# Patient Record
Sex: MALE | Employment: FULL TIME
[De-identification: names, ages, dates, MRNs, and addresses within clinical notes are randomized per-mention and may not be internally consistent; named-entity substitution may affect disease eponyms.]

---

## 2021-01-01 ENCOUNTER — EXTERNAL RECORD (OUTPATIENT)
Dept: HEALTH INFORMATION MANAGEMENT | Facility: OTHER | Age: 51
End: 2021-01-01

## 2021-06-11 ENCOUNTER — HOSPITAL ENCOUNTER (EMERGENCY)
Age: 51
Discharge: LEFT BEFORE DISCHARGE | End: 2021-06-11

## 2021-06-11 VITALS
RESPIRATION RATE: 16 BRPM | HEART RATE: 102 BPM | TEMPERATURE: 97.9 F | SYSTOLIC BLOOD PRESSURE: 118 MMHG | DIASTOLIC BLOOD PRESSURE: 85 MMHG | OXYGEN SATURATION: 95 %

## 2021-06-11 ASSESSMENT — PAIN SCALES - GENERAL: PAINLEVEL_OUTOF10: 5

## 2021-06-24 ENCOUNTER — TELEPHONE (OUTPATIENT)
Dept: SCHEDULING | Age: 51
End: 2021-06-24

## 2021-06-30 ENCOUNTER — OFFICE VISIT (OUTPATIENT)
Dept: INTERNAL MEDICINE | Age: 51
End: 2021-06-30

## 2021-06-30 VITALS
SYSTOLIC BLOOD PRESSURE: 107 MMHG | WEIGHT: 199.08 LBS | BODY MASS INDEX: 29.49 KG/M2 | HEIGHT: 69 IN | DIASTOLIC BLOOD PRESSURE: 75 MMHG | HEART RATE: 76 BPM | OXYGEN SATURATION: 97 % | TEMPERATURE: 97 F

## 2021-06-30 DIAGNOSIS — R13.10 DYSPHAGIA, UNSPECIFIED TYPE: Primary | ICD-10-CM

## 2021-06-30 DIAGNOSIS — Z12.11 ENCOUNTER FOR SCREENING COLONOSCOPY: ICD-10-CM

## 2021-06-30 DIAGNOSIS — I10 ESSENTIAL HYPERTENSION, BENIGN: ICD-10-CM

## 2021-06-30 DIAGNOSIS — Z21 HIV POSITIVE (CMD): ICD-10-CM

## 2021-06-30 PROBLEM — R20.2 PARESTHESIA OF LEFT UPPER AND LOWER EXTREMITY: Status: ACTIVE | Noted: 2020-10-19

## 2021-06-30 PROBLEM — Z00.00 PREVENTATIVE HEALTH CARE: Status: ACTIVE | Noted: 2020-10-19

## 2021-06-30 PROCEDURE — 3074F SYST BP LT 130 MM HG: CPT | Performed by: INTERNAL MEDICINE

## 2021-06-30 PROCEDURE — 3078F DIAST BP <80 MM HG: CPT | Performed by: INTERNAL MEDICINE

## 2021-06-30 PROCEDURE — 99204 OFFICE O/P NEW MOD 45 MIN: CPT | Performed by: INTERNAL MEDICINE

## 2021-06-30 RX ORDER — ALPRAZOLAM 0.5 MG/1
TABLET ORAL
COMMUNITY
Start: 2015-06-01

## 2021-06-30 RX ORDER — LANOLIN ALCOHOL/MO/W.PET/CERES
1000 CREAM (GRAM) TOPICAL DAILY
COMMUNITY

## 2021-06-30 RX ORDER — LISINOPRIL 5 MG/1
5 TABLET ORAL
COMMUNITY
End: 2021-08-14 | Stop reason: SDUPTHER

## 2021-06-30 RX ORDER — BICTEGRAVIR SODIUM, EMTRICITABINE, AND TENOFOVIR ALAFENAMIDE FUMARATE 50; 200; 25 MG/1; MG/1; MG/1
TABLET ORAL
COMMUNITY
Start: 2021-05-20

## 2021-06-30 RX ORDER — OMEPRAZOLE 20 MG/1
20 CAPSULE, DELAYED RELEASE ORAL DAILY
COMMUNITY

## 2021-06-30 RX ORDER — EMTRICITABINE AND TENOFOVIR ALAFENAMIDE 200; 25 MG/1; MG/1
TABLET ORAL
COMMUNITY
Start: 2017-12-08

## 2021-06-30 ASSESSMENT — PATIENT HEALTH QUESTIONNAIRE - PHQ9
1. LITTLE INTEREST OR PLEASURE IN DOING THINGS: NOT AT ALL
CLINICAL INTERPRETATION OF PHQ9 SCORE: NO FURTHER SCREENING NEEDED
2. FEELING DOWN, DEPRESSED OR HOPELESS: NOT AT ALL
SUM OF ALL RESPONSES TO PHQ9 QUESTIONS 1 AND 2: 0
SUM OF ALL RESPONSES TO PHQ9 QUESTIONS 1 AND 2: 0
CLINICAL INTERPRETATION OF PHQ2 SCORE: NO FURTHER SCREENING NEEDED

## 2021-06-30 ASSESSMENT — PAIN SCALES - GENERAL: PAINLEVEL: 0

## 2021-07-16 ENCOUNTER — TELEPHONE (OUTPATIENT)
Dept: SCHEDULING | Age: 51
End: 2021-07-16

## 2021-08-16 RX ORDER — LISINOPRIL 5 MG/1
5 TABLET ORAL DAILY
Qty: 90 TABLET | Refills: 2 | Status: SHIPPED | OUTPATIENT
Start: 2021-08-16 | End: 2021-10-14 | Stop reason: SDUPTHER

## 2021-09-10 ENCOUNTER — CLINICAL ABSTRACT (OUTPATIENT)
Dept: HEALTH INFORMATION MANAGEMENT | Age: 51
End: 2021-09-10

## 2021-10-06 ENCOUNTER — TELEPHONE (OUTPATIENT)
Dept: SCHEDULING | Age: 51
End: 2021-10-06

## 2021-10-06 DIAGNOSIS — R07.9 CHEST PAIN, UNSPECIFIED TYPE: Primary | ICD-10-CM

## 2021-10-14 RX ORDER — LISINOPRIL 5 MG/1
5 TABLET ORAL DAILY
Qty: 90 TABLET | Refills: 2 | Status: SHIPPED | OUTPATIENT
Start: 2021-10-14

## 2022-10-28 ENCOUNTER — LAB REQUISITION (OUTPATIENT)
Dept: LAB | Facility: CLINIC | Age: 52
End: 2022-10-28

## 2022-10-28 PROCEDURE — 86765 RUBEOLA ANTIBODY: CPT | Performed by: INTERNAL MEDICINE

## 2022-10-28 PROCEDURE — 86762 RUBELLA ANTIBODY: CPT | Performed by: INTERNAL MEDICINE

## 2022-10-28 PROCEDURE — 86481 TB AG RESPONSE T-CELL SUSP: CPT | Performed by: INTERNAL MEDICINE

## 2022-10-28 PROCEDURE — 86787 VARICELLA-ZOSTER ANTIBODY: CPT | Performed by: INTERNAL MEDICINE

## 2022-10-28 PROCEDURE — 86735 MUMPS ANTIBODY: CPT | Performed by: INTERNAL MEDICINE

## 2022-10-28 PROCEDURE — 86706 HEP B SURFACE ANTIBODY: CPT | Performed by: INTERNAL MEDICINE

## 2022-10-30 LAB
GAMMA INTERFERON BACKGROUND BLD IA-ACNC: 0.08 IU/ML
M TB IFN-G BLD-IMP: NEGATIVE
M TB IFN-G CD4+ BCKGRND COR BLD-ACNC: 9.92 IU/ML
MITOGEN IGNF BCKGRD COR BLD-ACNC: -0.01 IU/ML
MITOGEN IGNF BCKGRD COR BLD-ACNC: 0 IU/ML
QUANTIFERON MITOGEN: 10 IU/ML
QUANTIFERON NIL TUBE: 0.08 IU/ML
QUANTIFERON TB1 TUBE: 0.08 IU/ML
QUANTIFERON TB2 TUBE: 0.07

## 2022-10-31 LAB
HBV SURFACE AB SERPL IA-ACNC: 42.04 M[IU]/ML
HBV SURFACE AB SERPL IA-ACNC: REACTIVE M[IU]/ML
MEV IGG SER IA-ACNC: 18.5 AU/ML
MEV IGG SER IA-ACNC: POSITIVE
MUMPS ANTIBODY IGG INSTRUMENT VALUE: 236 AU/ML
MUV IGG SER QL IA: POSITIVE
RUBV IGG SERPL QL IA: 9.4 INDEX
RUBV IGG SERPL QL IA: POSITIVE
VZV IGG SER QL IA: >4000 INDEX
VZV IGG SER QL IA: POSITIVE

## 2022-11-28 ENCOUNTER — TRANSCRIBE ORDERS (OUTPATIENT)
Dept: OTHER | Age: 52
End: 2022-11-28

## 2022-11-28 DIAGNOSIS — G43.709 CHRONIC MIGRAINE WITHOUT AURA WITHOUT STATUS MIGRAINOSUS, NOT INTRACTABLE: Primary | ICD-10-CM

## 2022-12-19 NOTE — TELEPHONE ENCOUNTER
RECORDS RECEIVED FROM: CE   DATE RECEIVED: 1.26.23   NOTES (Gather within 2 years) STATUS DETAILS   OFFICE NOTE from other specialist CE 7.14.22  Jaime Gracia ID    5.16.22  Micky Gracia IM    2.10.22  Kath Gracia ID    6.30.21  Mini  [facility] IM    1.14.21, 5.7.20  Ryan  [facility] ID   LABS (any labs) Internal/CE    MEDICATION LIST Internal/CE

## 2022-12-29 ENCOUNTER — OFFICE VISIT (OUTPATIENT)
Dept: URGENT CARE | Facility: URGENT CARE | Age: 52
End: 2022-12-29
Payer: COMMERCIAL

## 2022-12-29 ENCOUNTER — ANCILLARY PROCEDURE (OUTPATIENT)
Dept: GENERAL RADIOLOGY | Facility: CLINIC | Age: 52
End: 2022-12-29
Attending: PHYSICIAN ASSISTANT
Payer: COMMERCIAL

## 2022-12-29 VITALS
TEMPERATURE: 97.4 F | SYSTOLIC BLOOD PRESSURE: 109 MMHG | DIASTOLIC BLOOD PRESSURE: 77 MMHG | OXYGEN SATURATION: 96 % | BODY MASS INDEX: 29.62 KG/M2 | HEIGHT: 69 IN | WEIGHT: 200 LBS | HEART RATE: 77 BPM

## 2022-12-29 DIAGNOSIS — S99.911A INJURY OF RIGHT ANKLE, INITIAL ENCOUNTER: Primary | ICD-10-CM

## 2022-12-29 LAB
CRP SERPL-MCNC: 5.9 MG/L (ref 0–8)
ERYTHROCYTE [SEDIMENTATION RATE] IN BLOOD BY WESTERGREN METHOD: 6 MM/HR (ref 0–20)
URATE SERPL-MCNC: 7.1 MG/DL (ref 3.5–7.2)

## 2022-12-29 PROCEDURE — 36415 COLL VENOUS BLD VENIPUNCTURE: CPT | Performed by: PHYSICIAN ASSISTANT

## 2022-12-29 PROCEDURE — 84550 ASSAY OF BLOOD/URIC ACID: CPT | Performed by: PHYSICIAN ASSISTANT

## 2022-12-29 PROCEDURE — 85652 RBC SED RATE AUTOMATED: CPT | Performed by: PHYSICIAN ASSISTANT

## 2022-12-29 PROCEDURE — 86140 C-REACTIVE PROTEIN: CPT | Performed by: PHYSICIAN ASSISTANT

## 2022-12-29 PROCEDURE — 99204 OFFICE O/P NEW MOD 45 MIN: CPT | Performed by: PHYSICIAN ASSISTANT

## 2022-12-29 PROCEDURE — 73610 X-RAY EXAM OF ANKLE: CPT | Mod: TC | Performed by: RADIOLOGY

## 2022-12-29 RX ORDER — CLONAZEPAM 0.5 MG/1
0.5 TABLET ORAL
COMMUNITY
Start: 2022-12-16 | End: 2023-02-02

## 2022-12-29 RX ORDER — PREDNISONE 20 MG/1
20 TABLET ORAL 2 TIMES DAILY
Qty: 10 TABLET | Refills: 0 | Status: SHIPPED | OUTPATIENT
Start: 2022-12-29 | End: 2023-01-03

## 2022-12-29 NOTE — PROGRESS NOTES
Injury of right ankle, initial encounter  - XR Ankle Right G/E 3 Views  - Uric acid; Future  - ESR: Erythrocyte sedimentation rate; Future  - CRP, inflammation; Future  - predniSONE (DELTASONE) 20 MG tablet; Take 1 tablet (20 mg) by mouth 2 times daily for 5 days  - Ankle/Foot Bracing Supplies Order for DME - ONLY FOR DME    Possible gout flareup although the patient does not match presentation perfectly. I would like him to start a 5 days course of prednisone and watch for improvement. Awaiting lab results.     Rest the affected area as much as possible.  Apply ice for 15-20 minutes intermittently as needed and especially after any offending activity. Hot packs are better for muscle spasms and cramping. Daily stretching as tolerated.  As pain recedes, begin normal activities slowly as tolerated.  Consider Physical Therapy after 6 weeks if symptoms not better with conservative care.      Okay to take acetaminophen 500 mg- 2 tabs (Total of 1000 mg) every 8 hrs   Okay to take ibuprofen 200 mg- 3 tabs (Total of 600 mg) every 6 hours      Jewel Cristina PA-C  [facility]    Subjective   52 year old who presents to clinic today for the following health issues:    Ankle Problem       HPI     Pain History:  When did you first notice your pain? - Acute Pain   Where in your body do you have pain? Musculoskeletal problem/pain  Onset/Duration: Yesterday pt might have twisted rt ankle, sore and hard to walk on.  Description  Location: Right ankle   Joint Swelling: No  Redness: No  Pain: YES  Warmth: No  Intensity:  moderate  Progression of Symptoms:  same  Accompanying signs and symptoms:   Fevers: No  Numbness/tingling/weakness: No  History  Trauma to the area: YES  Recent illness:  No  Previous similar problem: No  Previous evaluation:  No  Precipitating or alleviating factors:  Aggravating factors include: standing, walking and climbing stairs  Therapies tried and outcome: rest/inactivity, ice and  elevation    Review of Systems   Review of Systems   See HPI    Objective    Temp: 97.4  F (36.3  C) Temp src: Temporal BP: 109/77 Pulse: 77     SpO2: 96 %       Physical Exam   Physical Exam  Constitutional:       General: He is not in acute distress.     Appearance: Normal appearance. He is normal weight. He is not ill-appearing, toxic-appearing or diaphoretic.   HENT:      Head: Normocephalic and atraumatic.   Cardiovascular:      Rate and Rhythm: Normal rate.      Pulses: Normal pulses.   Pulmonary:      Effort: Pulmonary effort is normal. No respiratory distress.   Musculoskeletal:        Feet:    Feet:      Comments: Pain in the areas shown above with bearing weight but no warmth, erythema, or deformities noted.   Neurological:      Mental Status: He is alert.   Psychiatric:         Mood and Affect: Mood normal.         Behavior: Behavior normal.         Thought Content: Thought content normal.         Judgment: Judgment normal.          Results for orders placed or performed in visit on 12/29/22 (from the past 24 hour(s))   XR Ankle Right G/E 3 Views    Narrative    EXAM: ANKLE RIGHT THREE OR MORE VIEWS  DATE/TIME: 12/29/2022 11:23 AM     INDICATION: Right ankle injury and pain.  COMPARISON: None.      Impression    IMPRESSION: Normal joint spacing and alignment.  No fracture.   Achilles calcaneal spur.       JAMES FAITH MD         SYSTEM ID:  [id number]

## 2023-01-26 ENCOUNTER — PRE VISIT (OUTPATIENT)
Dept: INFECTIOUS DISEASES | Facility: CLINIC | Age: 53
End: 2023-01-26
Payer: COMMERCIAL

## 2023-01-26 ASSESSMENT — ENCOUNTER SYMPTOMS
MYALGIAS: 1
DIZZINESS: 0
HEADACHES: 1
SEIZURES: 0
BACK PAIN: 1
PARALYSIS: 0
SPEECH CHANGE: 0
MEMORY LOSS: 0
MUSCLE WEAKNESS: 0
JOINT SWELLING: 0
WEAKNESS: 0
TINGLING: 1
STIFFNESS: 1
LOSS OF CONSCIOUSNESS: 0
TREMORS: 0
NECK PAIN: 1
DISTURBANCES IN COORDINATION: 0
NUMBNESS: 1
ARTHRALGIAS: 1
MUSCLE CRAMPS: 0

## 2023-01-30 NOTE — TELEPHONE ENCOUNTER
3/27/2023-Request for images faxed to [facility] @ 1043am  -Images now in PACS-MR @ 1253pm    FUTURE VISIT INFORMATION      FUTURE VISIT INFORMATION:    Date: 4/20/2023    Time: 830am    Location: [facility]  REFERRAL INFORMATION:    Referring provider:  Dr. Shetty     Referring providers clinic:  [facility]     Reason for visit/diagnosis  Migraines     RECORDS REQUESTED FROM:       Clinic name Comments Records Status Imaging Status   [facility]  ED Visit-11/7/2022    CT Head-11/7/2022    MR Brain-9/26/2021 Care Everywhere Requested

## 2023-02-02 ENCOUNTER — OFFICE VISIT (OUTPATIENT)
Dept: INFECTIOUS DISEASES | Facility: CLINIC | Age: 53
End: 2023-02-02
Attending: INTERNAL MEDICINE
Payer: COMMERCIAL

## 2023-02-02 VITALS
HEIGHT: 69 IN | DIASTOLIC BLOOD PRESSURE: 71 MMHG | WEIGHT: 205.2 LBS | SYSTOLIC BLOOD PRESSURE: 109 MMHG | BODY MASS INDEX: 30.39 KG/M2 | HEART RATE: 78 BPM | TEMPERATURE: 98.2 F

## 2023-02-02 DIAGNOSIS — R21 FACIAL RASH: ICD-10-CM

## 2023-02-02 DIAGNOSIS — B20 HUMAN IMMUNODEFICIENCY VIRUS (HIV) DISEASE (H): Primary | ICD-10-CM

## 2023-02-02 DIAGNOSIS — R51.9 ACHING HEADACHE: ICD-10-CM

## 2023-02-02 PROCEDURE — 99204 OFFICE O/P NEW MOD 45 MIN: CPT | Mod: GC | Performed by: INTERNAL MEDICINE

## 2023-02-02 PROCEDURE — G0463 HOSPITAL OUTPT CLINIC VISIT: HCPCS | Performed by: INTERNAL MEDICINE

## 2023-02-02 RX ORDER — LISINOPRIL 5 MG/1
1 TABLET ORAL DAILY
COMMUNITY
Start: 2021-10-14 | End: 2023-02-02

## 2023-02-02 RX ORDER — LISINOPRIL 5 MG/1
5 TABLET ORAL DAILY
Qty: 90 TABLET | Refills: 1 | Status: SHIPPED | OUTPATIENT
Start: 2023-02-02 | End: 2023-09-21

## 2023-02-02 ASSESSMENT — PAIN SCALES - GENERAL: PAINLEVEL: NO PAIN (0)

## 2023-02-02 NOTE — NURSING NOTE
"Chief Complaint   Patient presents with     Consult     Establish care with B20     /71   Pulse 78   Temp 98.2  F (36.8  C) (Oral)   Ht 1.753 m (5' 9\")   Wt 93.1 kg (205 lb 3.2 oz)   BMI 30.30 kg/m    Marie Avery CMA on 2/2/2023 at 8:11 AM    "

## 2023-02-02 NOTE — PROGRESS NOTES
[facility]    Division of Infectious Diseases and International Medicine    CLINICAL INFECTIOUS DISEASE OUTPATIENT CONSULTATION NOTE     Patient:  Toi Gresham, Date of birth 1970, Medical record number [id number]  Referred by: No ref. provider found   Reason for Visit: Establish care         Assessment and Plan     Human immunodeficiency virus   Excellent adherence, undetectable 9/2022. Previously followed in [place] at [facility] and [facility].   - CBC, CMP  - HIV VL, T cell subset     Facial rash   Ddx includes seborrheic dermatitis vs rosacea. Using OTC hydrocortisone with some minimal effect.   - Dermatology referral    Cervical and lumbar radiculoapthy   Cifuentes  Had previously been seen by Neurology and was felt the ha was related to cervical radiculopathy. He has an appointment scheduled 4/20/23 to follow up on this which he was encouraged to keep.     HTN   Refill Lisinopril 5mg, BP at goal     Microscopic hematuria   Reports this has been present for many years. Previously had it worked up with a renal ultrasound which was unremarkable. sCr generally ~1.2.   - Repeat UA, will monitor for proteinuria     GERD  Reflux esophagitis on EGD 2021, on Omeprazole but would like to discuss potential alternatives at future appointment  - Continue Omeprazole for now     Preventative Medicine  Cardiovascular Stanislaw:                        Lipids: 9/2021 , HDL 57, TG 54, LDL 61                       Blood Pressure: At goal, on 5mg Lisinopril   Cancer Screening:                       Colon: 8/13/2021 negative                        Anal: will address next visit   Immunizations:                       Hepatitis A: not on file, 12/2017 reactive IgG                       Hepatitis B:  not on file, 10/28/22 immune by serology                        Influenza: Recommended Seasonal Vaccine                       Pneumovax/Prevnar PPSV23 4/24/2018                       Tdap: 4/28/2016                        Meningococcus: 1/26/2016, 4/28/2016  Bone Health: No screening indicated at this time  Renal Health: Cr: 1.22, UA done 1/2021 with small blood, reports h/o unremarkable renal US   Sexually Transmitted Infection Risk and Screening:                       Gonorrhea and Chlamydia: not detected 9/2021                        Syphilis: RPR negative 9/2021               RTC 6 months         HIV History:   Date of Diagnosis: August 1990, started ART 2009  Approximated time of transmission: Negative 2 months prior to diagnosis   CD4 Chencho: 289  Viral Load at Diagnosis: not on file   Opportunistic Infections: no h/o OI  CMV Status: not on file   Toxo Status: not on file   HLA  Status: not on file   Tuberculosis Screening: 10/28/2022   Historical use of ARVs: Atripla, Raltegravir + Truvada, Dolutegravir + Descovy   Historical Resistance Mutations: not on file        History of Present Illness:     Toi Gresham is a 52 year old y.o with a PMH of HTN, HIV who presents to [facility] care.     Toi moved to the [facility] about 1 year ago. Briefly was seen at [facility] but needed to transfer care to  for insurance purposes. He was diagnosed with HIV in 1990 and did not start ART until about 2009 when Atripla came out. Has been on various ART regimens including Atripla, Raltegravir + Truvvada, Doluitegravir + Descovy with good control. Was switched to Biktarvy about 6 months ago and has been tolerating it well. Reports no missed doses. Sexually active, monogamous with partner, no acute concerns for STI.     Has a rash on his face which has been present for many years. Located mostly around his eyebrows and nasolabial folds. Slightly erythematous and flaky, occasionally itchy. Has been using some OTC hydrocortisone cream with helps minimally. Does have ha which has previously been worked up and been attributed to cervical radiculopathy.        Key Prior Lab/Imaging and other data   10/28/22   Quant gold  negative   VZV IgG reactive   Rubella IgG reactive   Mumps IgG reactive   Rubeola IgG reactive   HBV surface Ab reactive     9/20/22   HIV RNA quant not detected   Cr 1.22        Review of Systems:     The following systems were reviewed with the patient as they pertain to the case and are negative unless noted here or above in the HPI. The patient was  able to participate in the following review of systems    REVIEW OF SYSTEMS:   Constitutional: No fevers, no chills, no sweats  Cardiac: No history of chest pain, No palpitations  Respiratory: No shortness of breath, no wheeze, no cough  Gastro Intestinal: No history of abdominal pain, no vomiting, no diarrhea  Neurological: + headaches, no new or changing weakness, no new or changing numbness  Musculoskeletal: No joint pain or swelling, +back pain HEENT: No congestion No stridor  Allergic: No Hives No Rash  Hematologic: No Easy Bruising, No Nosebleeds,   Genitourinary: No Dysuria, No Frequency  Endocrine: No Polyuria, No Polydipsia  Skin/Integumentary: + Rash, No Ulcer  Opthalmologic: No Diplopia, No Flashes        Other Medical History:     Attempt was made to collect past, family and social history during this encounter,  this information was reviewed with the patient and updated    Allergies Patient has no known allergies.    Past Medical History  No past medical history on file. PastSurgical History   has no past surgical history on file.   Family History  No family history on file. Social History  He reports that he has never smoked. He has never used smokeless tobacco. He reports current alcohol use. He reports that he does not use drugs.   , partner works in [employer] business which is why they relocated to [place]. 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    Notable Exposures Listed below if pertinent    Vaccination History:  Immunization History   Administered Date(s) Administered     COVID-19 Vaccine Bivalent  "Booster 12+ (Pfizer) 09/24/2022             Physical Exam:     VITAL SIGNS:  Blood pressure 109/71, pulse 78, temperature 98.2  F (36.8  C), temperature source Oral, height 1.753 m (5' 9\"), weight 93.1 kg (205 lb 3.2 oz).     GENERAL APPEARANCE: Not in acute distress  PHYSICAL EXAM:   Eyes:     no ptosis, no discharge, no scleral icterus  Mouth, Throat:     mucous membranes moist  Cardiovascular:    Inspection: No Cyanosis, JVD not elevated   Auscultation:  S1, S2 normal, regular rate and rhythm  Respiratory:     Inspection: Not in respiratory distress, Chest expansion symmetrical   Auscultation: 4 point auscultation done clear to auscultation bilaterally, no wheezes, no rales, and no rhonchi  Musculoskeletal:     no elbow wrist knee or ankle deformity, no quadriceps calf or upper arm muscular tenderness noted  Skin:     Dry and intact, there is slight erythematous rash along b/l eyebrows and nasolabial folds   Neurologic:     Higher Mental Function: Conversant, AOx4   Facial asymmetry grossly absent   Patient is ambulatory   Psychiatric:     Appropriate        Signature:     Sunshine Holt MD   Infectious Disease Fellow    Case discussed with the supervising attending ID physician, Dr Jean    This dictation was prepared in part using Dragon recognition software.  As a result errors may occur. When identified these transcription errors have been corrected.  While every attempt is made to correct errors during dictation, errors may still exist     "

## 2023-02-02 NOTE — LETTER
2/2/2023       RE: Toi Gresham  [address] [facility] [address]     Dear Colleague,    Thank you for referring your patient, Toi Gresham, to the [facility] [place] at [facility]. Please see a copy of my visit note below.     [facility]    Division of Infectious Diseases and International Medicine    CLINICAL INFECTIOUS DISEASE OUTPATIENT CONSULTATION NOTE     Patient:  Toi Gresham, Date of birth 1970, Medical record number [id number]  Referred by: No ref. provider found   Reason for Visit: Establish care         Assessment and Plan     Human immunodeficiency virus   Excellent adherence, undetectable 9/2022. Previously followed in [place] at [facility] and [facility].   - CBC, CMP  - HIV VL, T cell subset     Facial rash   Ddx includes seborrheic dermatitis vs rosacea. Using OTC hydrocortisone with some minimal effect.   - Dermatology referral    Cervical and lumbar radiculoapthy   Cifuentes  Had previously been seen by Neurology and was felt the ha was related to cervical radiculopathy. He has an appointment scheduled 4/20/23 to follow up on this which he was encouraged to keep.     HTN   Refill Lisinopril 5mg, BP at goal     Microscopic hematuria   Reports this has been present for many years. Previously had it worked up with a renal ultrasound which was unremarkable. sCr generally ~1.2.   - Repeat UA, will monitor for proteinuria     GERD  Reflux esophagitis on EGD 2021, on Omeprazole but would like to discuss potential alternatives at future appointment  - Continue Omeprazole for now     Preventative Medicine  Cardiovascular Stanislaw:                        Lipids: 9/2021 , HDL 57, TG 54, LDL 61                       Blood Pressure: At goal, on 5mg Lisinopril   Cancer Screening:                       Colon: 8/13/2021 negative                        Anal: will address next visit    Immunizations:                       Hepatitis A: not on file, 12/2017 reactive IgG                       Hepatitis B:  not on file, 10/28/22 immune by serology                        Influenza: Recommended Seasonal Vaccine                       Pneumovax/Prevnar PPSV23 4/24/2018                       Tdap: 4/28/2016                       Meningococcus: 1/26/2016, 4/28/2016  Bone Health: No screening indicated at this time  Renal Health: Cr: 1.22, UA done 1/2021 with small blood, reports h/o unremarkable renal US   Sexually Transmitted Infection Risk and Screening:                       Gonorrhea and Chlamydia: not detected 9/2021                        Syphilis: RPR negative 9/2021               RTC 6 months         HIV History:   Date of Diagnosis: August 1990, started ART 2009  Approximated time of transmission: Negative 2 months prior to diagnosis   CD4 Chencho: 289  Viral Load at Diagnosis: not on file   Opportunistic Infections: no h/o OI  CMV Status: not on file   Toxo Status: not on file   HLA  Status: not on file   Tuberculosis Screening: 10/28/2022   Historical use of ARVs: Atripla, Raltegravir + Truvada, Dolutegravir + Descovy   Historical Resistance Mutations: not on file        History of Present Illness:     Toi Gresham is a 52 year old y.o with a PMH of HTN, HIV who presents to [facility] care.     Toi moved to the [facility] about 1 year ago. Briefly was seen at [facility] but needed to transfer care to  for insurance purposes. He was diagnosed with HIV in 1990 and did not start ART until about 2009 when Atripla came out. Has been on various ART regimens including Atripla, Raltegravir + Truvvada, Doluitegravir + Descovy with good control. Was switched to Biktarvy about 6 months ago and has been tolerating it well. Reports no missed doses. Sexually active, monogamous with partner, no acute concerns for STI.     Has a rash on his face which has been present for many years. Located mostly around  his eyebrows and nasolabial folds. Slightly erythematous and flaky, occasionally itchy. Has been using some OTC hydrocortisone cream with helps minimally. Does have ha which has previously been worked up and been attributed to cervical radiculopathy.        Key Prior Lab/Imaging and other data   10/28/22   Quant gold negative   VZV IgG reactive   Rubella IgG reactive   Mumps IgG reactive   Rubeola IgG reactive   HBV surface Ab reactive     9/20/22   HIV RNA quant not detected   Cr 1.22        Review of Systems:     The following systems were reviewed with the patient as they pertain to the case and are negative unless noted here or above in the HPI. The patient was  able to participate in the following review of systems    REVIEW OF SYSTEMS:   Constitutional: No fevers, no chills, no sweats  Cardiac: No history of chest pain, No palpitations  Respiratory: No shortness of breath, no wheeze, no cough  Gastro Intestinal: No history of abdominal pain, no vomiting, no diarrhea  Neurological: + headaches, no new or changing weakness, no new or changing numbness  Musculoskeletal: No joint pain or swelling, +back pain HEENT: No congestion No stridor  Allergic: No Hives No Rash  Hematologic: No Easy Bruising, No Nosebleeds,   Genitourinary: No Dysuria, No Frequency  Endocrine: No Polyuria, No Polydipsia  Skin/Integumentary: + Rash, No Ulcer  Opthalmologic: No Diplopia, No Flashes        Other Medical History:     Attempt was made to collect past, family and social history during this encounter,  this information was reviewed with the patient and updated    Allergies Patient has no known allergies.    Past Medical History  No past medical history on file. PastSurgical History   has no past surgical history on file.   Family History  No family history on file. Social History  He reports that he has never smoked. He has never used smokeless tobacco. He reports current alcohol use. He reports that he does not use drugs.  "  , partner works in [id number] business which is why they relocated to [place]. 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    Notable Exposures Listed below if pertinent    Vaccination History:  Immunization History   Administered Date(s) Administered     COVID-19 Vaccine Bivalent Booster 12+ (Pfizer) 09/24/2022             Physical Exam:     VITAL SIGNS:  Blood pressure 109/71, pulse 78, temperature 98.2  F (36.8  C), temperature source Oral, height 1.753 m (5' 9\"), weight 93.1 kg (205 lb 3.2 oz).     GENERAL APPEARANCE: Not in acute distress  PHYSICAL EXAM:   Eyes:     no ptosis, no discharge, no scleral icterus  Mouth, Throat:     mucous membranes moist  Cardiovascular:    Inspection: No Cyanosis, JVD not elevated   Auscultation:  S1, S2 normal, regular rate and rhythm  Respiratory:     Inspection: Not in respiratory distress, Chest expansion symmetrical   Auscultation: 4 point auscultation done clear to auscultation bilaterally, no wheezes, no rales, and no rhonchi  Musculoskeletal:     no elbow wrist knee or ankle deformity, no quadriceps calf or upper arm muscular tenderness noted  Skin:     Dry and intact, there is slight erythematous rash along b/l eyebrows and nasolabial folds   Neurologic:     Higher Mental Function: Conversant, AOx4   Facial asymmetry grossly absent   Patient is ambulatory   Psychiatric:     Appropriate        Signature:     Sunshine Holt MD   Infectious Disease Fellow    Case discussed with the supervising attending ID physician, Dr Jean    This dictation was prepared in part using Dragon recognition software.  As a result errors may occur. When identified these transcription errors have been corrected.  While every attempt is made to correct errors during dictation, errors may still exist       Attestation signed by Alison Jean MD at 2/8/2023  5:40 PM:  Attestation:    I have seen and evaluated the patient and have discussed " his/her care with the fellow. I agree with the documented findings and plan. I have reviewed today's vital signs, medications, labs and imaging.    Alison Jean MD  Infectious Diseases  Pager: [phone]

## 2023-02-07 ENCOUNTER — LAB (OUTPATIENT)
Dept: LAB | Facility: CLINIC | Age: 53
End: 2023-02-07
Payer: COMMERCIAL

## 2023-02-07 DIAGNOSIS — B20 HUMAN IMMUNODEFICIENCY VIRUS (HIV) DISEASE (H): ICD-10-CM

## 2023-02-07 LAB
ALBUMIN SERPL BCG-MCNC: 4.4 G/DL (ref 3.5–5.2)
ALBUMIN UR-MCNC: NEGATIVE MG/DL
ALP SERPL-CCNC: 74 U/L (ref 40–129)
ALT SERPL W P-5'-P-CCNC: 27 U/L (ref 10–50)
ANION GAP SERPL CALCULATED.3IONS-SCNC: 8 MMOL/L (ref 7–15)
APPEARANCE UR: CLEAR
AST SERPL W P-5'-P-CCNC: 23 U/L (ref 10–50)
BASOPHILS # BLD AUTO: 0 10E3/UL (ref 0–0.2)
BASOPHILS NFR BLD AUTO: 1 %
BILIRUB SERPL-MCNC: 0.6 MG/DL
BILIRUB UR QL STRIP: NEGATIVE
BUN SERPL-MCNC: 21.6 MG/DL (ref 6–20)
CALCIUM SERPL-MCNC: 9.7 MG/DL (ref 8.6–10)
CD3 CELLS # BLD: 1251 CELLS/UL (ref 603–2990)
CD3 CELLS NFR BLD: 83 % (ref 49–84)
CD3+CD4+ CELLS # BLD: 597 CELLS/UL (ref 441–2156)
CD3+CD4+ CELLS NFR BLD: 40 % (ref 28–63)
CD3+CD4+ CELLS/CD3+CD8+ CLL BLD: 0.92 % (ref 1.4–2.6)
CD3+CD8+ CELLS # BLD: 648 CELLS/UL (ref 125–1312)
CD3+CD8+ CELLS NFR BLD: 43 % (ref 10–40)
CHLORIDE SERPL-SCNC: 104 MMOL/L (ref 98–107)
COLOR UR AUTO: ABNORMAL
CREAT SERPL-MCNC: 1.29 MG/DL (ref 0.67–1.17)
DEPRECATED HCO3 PLAS-SCNC: 28 MMOL/L (ref 22–29)
EOSINOPHIL # BLD AUTO: 0.1 10E3/UL (ref 0–0.7)
EOSINOPHIL NFR BLD AUTO: 2 %
ERYTHROCYTE [DISTWIDTH] IN BLOOD BY AUTOMATED COUNT: 12.7 % (ref 10–15)
GFR SERPL CREATININE-BSD FRML MDRD: 67 ML/MIN/1.73M2
GLUCOSE SERPL-MCNC: 116 MG/DL (ref 70–99)
GLUCOSE UR STRIP-MCNC: NEGATIVE MG/DL
HCT VFR BLD AUTO: 46.5 % (ref 40–53)
HGB BLD-MCNC: 15.4 G/DL (ref 13.3–17.7)
HGB UR QL STRIP: ABNORMAL
IMM GRANULOCYTES # BLD: 0 10E3/UL
IMM GRANULOCYTES NFR BLD: 0 %
KETONES UR STRIP-MCNC: NEGATIVE MG/DL
LEUKOCYTE ESTERASE UR QL STRIP: NEGATIVE
LYMPHOCYTES # BLD AUTO: 1.4 10E3/UL (ref 0.8–5.3)
LYMPHOCYTES NFR BLD AUTO: 29 %
MCH RBC QN AUTO: 30.7 PG (ref 26.5–33)
MCHC RBC AUTO-ENTMCNC: 33.1 G/DL (ref 31.5–36.5)
MCV RBC AUTO: 93 FL (ref 78–100)
MONOCYTES # BLD AUTO: 0.5 10E3/UL (ref 0–1.3)
MONOCYTES NFR BLD AUTO: 10 %
MUCOUS THREADS #/AREA URNS LPF: PRESENT /LPF
NEUTROPHILS # BLD AUTO: 2.9 10E3/UL (ref 1.6–8.3)
NEUTROPHILS NFR BLD AUTO: 58 %
NITRATE UR QL: NEGATIVE
NRBC # BLD AUTO: 0 10E3/UL
NRBC BLD AUTO-RTO: 0 /100
PH UR STRIP: 5.5 [PH] (ref 5–7)
PLATELET # BLD AUTO: 169 10E3/UL (ref 150–450)
POTASSIUM SERPL-SCNC: 4.6 MMOL/L (ref 3.4–5.3)
PROT SERPL-MCNC: 6.9 G/DL (ref 6.4–8.3)
RBC # BLD AUTO: 5.02 10E6/UL (ref 4.4–5.9)
RBC URINE: 4 /HPF
SODIUM SERPL-SCNC: 140 MMOL/L (ref 136–145)
SP GR UR STRIP: 1.02 (ref 1–1.03)
T CELL COMMENT: ABNORMAL
UROBILINOGEN UR STRIP-MCNC: NORMAL MG/DL
WBC # BLD AUTO: 4.9 10E3/UL (ref 4–11)
WBC URINE: 1 /HPF

## 2023-02-07 PROCEDURE — 36415 COLL VENOUS BLD VENIPUNCTURE: CPT | Performed by: PATHOLOGY

## 2023-02-07 PROCEDURE — 85025 COMPLETE CBC W/AUTO DIFF WBC: CPT | Performed by: PATHOLOGY

## 2023-02-07 PROCEDURE — 86360 T CELL ABSOLUTE COUNT/RATIO: CPT | Performed by: INTERNAL MEDICINE

## 2023-02-07 PROCEDURE — 83036 HEMOGLOBIN GLYCOSYLATED A1C: CPT | Performed by: INTERNAL MEDICINE

## 2023-02-07 PROCEDURE — 80053 COMPREHEN METABOLIC PANEL: CPT | Performed by: PATHOLOGY

## 2023-02-07 PROCEDURE — 87536 HIV-1 QUANT&REVRSE TRNSCRPJ: CPT | Performed by: INTERNAL MEDICINE

## 2023-02-07 PROCEDURE — 81001 URINALYSIS AUTO W/SCOPE: CPT | Performed by: PATHOLOGY

## 2023-02-09 LAB — HIV1 RNA # PLAS NAA DL=20: NOT DETECTED COPIES/ML

## 2023-02-12 ENCOUNTER — HEALTH MAINTENANCE LETTER (OUTPATIENT)
Age: 53
End: 2023-02-12

## 2023-02-17 ENCOUNTER — TELEPHONE (OUTPATIENT)
Dept: INFECTIOUS DISEASES | Facility: CLINIC | Age: 53
End: 2023-02-17
Payer: COMMERCIAL

## 2023-02-17 DIAGNOSIS — Z00.00 HEALTH CARE MAINTENANCE: Primary | ICD-10-CM

## 2023-02-17 DIAGNOSIS — B20 HUMAN IMMUNODEFICIENCY VIRUS (HIV) DISEASE (H): ICD-10-CM

## 2023-02-17 DIAGNOSIS — Z53.9 ERRONEOUS ENCOUNTER--DISREGARD: Primary | ICD-10-CM

## 2023-02-17 PROCEDURE — 10000001 PR ERRONEOUS ENCOUNTER--DISREGARD

## 2023-02-17 NOTE — TELEPHONE ENCOUNTER
ENDOSCOPY - EGD/COLONOSCOPY       ADULT CARE DISCHARGE  INSTRUCTIONS     Symptoms you may temporarily experience:      Sore Throat     Hoarseness     Bloating/Cramping     Dizziness     IV Irritation/tenderness     Gas or Belching     Slight fever     Small amount of blood in vomit or stool       Call Your Doctor for the following Problems: ______________________     ________________     Fever of 101 degrees or higher       Sharp abdominal  pain     Red streak up the arm from the IV site     Severe cramping        Large amount of blood in stool or vomit       Instructions for the next 24 hours after your Procedure:     Adult supervision     Do NOT drink any alcohol      Do not work today     NO important decisions     DO NOT sign any legal documents     You may shower/ bathe       DO NOT  DRIVE or operate machinery     Resume normal activity tomorrow       Discharge  Diet:     Avoid spicy/ greasy foods     Avoid any food that will cause more gas or bloating       *** Seek IMMEDIATE medical attention and call 911 if you develop symptoms such as:     Chest pain     Shortness of breath     Severe bleeding     [facility] Call [facility]    Phone Message    May a detailed message be left on voicemail: yes     Reason for Call: Other: Patient tested Positive on a rapid of Covid.  Patient would like to know if he is eligible for the Medication patients can get for Covid. Please reach out.      Action Taken: Other: ID    Travel Screening: Not Applicable

## 2023-02-17 NOTE — TELEPHONE ENCOUNTER
Called pt who reports he began having symptoms of chest congestion, muscle aches, fatigue and temp 100, 101 on Wed 2/15. Pt then had positive COVID test on 2/16. Pt has BMI 30.3 so would be eligible for Paxlovid. Dr Jean OK with him being prescribed this medication. Med E-sent to pt's [facility] on [address]

## 2023-02-20 ENCOUNTER — TELEPHONE (OUTPATIENT)
Dept: FAMILY MEDICINE | Facility: CLINIC | Age: 53
End: 2023-02-20

## 2023-02-20 ENCOUNTER — VIRTUAL VISIT (OUTPATIENT)
Dept: URGENT CARE | Facility: CLINIC | Age: 53
End: 2023-02-20
Payer: COMMERCIAL

## 2023-02-20 ENCOUNTER — OFFICE VISIT (OUTPATIENT)
Dept: FAMILY MEDICINE | Facility: CLINIC | Age: 53
End: 2023-02-20
Payer: COMMERCIAL

## 2023-02-20 VITALS
SYSTOLIC BLOOD PRESSURE: 115 MMHG | OXYGEN SATURATION: 97 % | TEMPERATURE: 98.7 F | WEIGHT: 196.3 LBS | BODY MASS INDEX: 28.99 KG/M2 | HEART RATE: 83 BPM | DIASTOLIC BLOOD PRESSURE: 79 MMHG | RESPIRATION RATE: 18 BRPM

## 2023-02-20 DIAGNOSIS — U07.1 SARS-COV-2 POSITIVE: Primary | ICD-10-CM

## 2023-02-20 DIAGNOSIS — U07.1 INFECTION DUE TO 2019 NOVEL CORONAVIRUS: Primary | ICD-10-CM

## 2023-02-20 PROBLEM — M54.16 LUMBAR RADICULOPATHY: Status: ACTIVE | Noted: 2020-10-19

## 2023-02-20 PROBLEM — B20 HUMAN IMMUNODEFICIENCY VIRUS (HIV) DISEASE (H): Status: ACTIVE | Noted: 2020-10-19

## 2023-02-20 PROBLEM — E11.9 DIABETES MELLITUS, TYPE 2 (H): Status: ACTIVE | Noted: 2023-02-20

## 2023-02-20 PROBLEM — I10 ESSENTIAL HYPERTENSION, BENIGN: Status: ACTIVE | Noted: 2019-01-17

## 2023-02-20 PROBLEM — F41.9 ANXIETY: Status: ACTIVE | Noted: 2019-01-17

## 2023-02-20 PROBLEM — Z21 HIV POSITIVE (H): Status: ACTIVE | Noted: 2019-01-17

## 2023-02-20 PROCEDURE — 99207 PR NO CHARGE LOS: CPT

## 2023-02-20 PROCEDURE — 99213 OFFICE O/P EST LOW 20 MIN: CPT | Mod: CS | Performed by: FAMILY MEDICINE

## 2023-02-20 RX ORDER — NORTRIPTYLINE HCL 10 MG
CAPSULE ORAL
COMMUNITY
Start: 2021-10-04 | End: 2023-08-03

## 2023-02-20 RX ORDER — CODEINE PHOSPHATE/GUAIFENESIN 10-100MG/5
5 LIQUID (ML) ORAL EVERY 4 HOURS PRN
Qty: 240 ML | Refills: 0 | Status: SHIPPED | OUTPATIENT
Start: 2023-02-20 | End: 2023-08-03

## 2023-02-20 RX ORDER — ESCITALOPRAM OXALATE 10 MG/1
1 TABLET ORAL DAILY
COMMUNITY
Start: 2021-10-14 | End: 2023-08-03

## 2023-02-20 RX ORDER — BENZONATATE 200 MG/1
200 CAPSULE ORAL 3 TIMES DAILY PRN
Qty: 21 CAPSULE | Refills: 1 | Status: SHIPPED | OUTPATIENT
Start: 2023-02-20 | End: 2023-08-03

## 2023-02-20 RX ORDER — OMEPRAZOLE 40 MG/1
40 CAPSULE, DELAYED RELEASE ORAL
COMMUNITY
Start: 2022-01-05 | End: 2023-08-03

## 2023-02-20 RX ORDER — AZITHROMYCIN 250 MG/1
TABLET, FILM COATED ORAL
Qty: 6 TABLET | Refills: 0 | Status: SHIPPED | OUTPATIENT
Start: 2023-02-20 | End: 2023-02-25

## 2023-02-20 NOTE — PATIENT INSTRUCTIONS
Cough medications as needed.    May start azithromycin if you are not improving.    Recheck if you are getting worse.

## 2023-02-20 NOTE — PROGRESS NOTES
OUTPATIENT VISIT NOTE                                                   Date of Visit: 2/20/2023     Chief Complaint   Patient presents with:  Covid Concern: Fever. Cough. SOB. Chest congestion. Chills. Bodyache. Night sweat. Covid pos x Wednesday 02/15.            History of Present Illness   Toi Gresham is a 52 year old male with HIV, last CD4 count within a couple weeks, has been sick for a week. Tested positive for covid 5 days ago.  Started paxlovid three days ago.  Continues to run fever.  Short of breath on exertion.  Coughing, productive--has had some streaks of blood.  Some chest pain.  Some chest tightness.  No stomach upset.    Taking tylenol and ibuprofen as needed.  Eating OK.  Good fluid intake.         MEDICATIONS   Current Outpatient Medications   Medication     azithromycin (ZITHROMAX) 250 MG tablet     benzonatate (TESSALON) 200 MG capsule     bictegravir-emtricitabine-tenofovir (BIKTARVY) -25 MG per tablet     dolutegravir (TIVICAY) 50 MG tablet     emtricitabine-tenofovir AF (DESCOVY) 200-25 MG per tablet     escitalopram (LEXAPRO) 10 MG tablet     guaiFENesin-codeine (GUAIFENESIN AC) 100-10 MG/5ML syrup     lisinopril (ZESTRIL) 5 MG tablet     nirmatrelvir and ritonavir (PAXLOVID) therapy pack     nortriptyline (PAMELOR) 10 MG capsule     omeprazole (PRILOSEC) 20 MG DR capsule     omeprazole (PRILOSEC) 40 MG DR capsule     sertraline (ZOLOFT) 50 MG tablet     vitamin B-12 (CYANOCOBALAMIN) 1000 MCG tablet     No current facility-administered medications for this visit.         SOCIAL HISTORY   Social History     Tobacco Use     Smoking status: Never     Smokeless tobacco: Never   Substance Use Topics     Alcohol use: Yes     Comment: 4 to 5 drinks weekly           Physical Exam   Vitals:    02/20/23 1332   BP: 115/79   Pulse: 83   Resp: 18   Temp: 98.7  F (37.1  C)   TempSrc: Oral   SpO2: 97%   Weight: 89 kg (196 lb 4.8 oz)        GENERAL:   Alert. Oriented.  EYES: Clear  HENT:  Ears: R  TM pearly gray. Normal landmarks. L TM pearly gray.  Normal landmarks  Nose: Clear.  Sinuses: Nontender.  Oropharynx:  No erythema. No exudate.  NECK: Supple. No adenopathy.  LUNGS: Clear to ascultation.  No crackles.  No wheezing  HEART: RRR  SKIN:  No rash.          Assessment and Plan     Infection due to 2019 novel coronavirus  On paxlovid.  Appears only mildly ill.  Good sats.  Lungs completely clear.  Cough medications as ordered.  Try mucinex.  May start zithromax if not improving.  But recheck if getting worse.  - benzonatate (TESSALON) 200 MG capsule  Dispense: 21 capsule; Refill: 1  - azithromycin (ZITHROMAX) 250 MG tablet  Dispense: 6 tablet; Refill: 0  - guaiFENesin-codeine (GUAIFENESIN AC) 100-10 MG/5ML syrup  Dispense: 240 mL; Refill: 0                   Discussed signs / symptoms that warrant urgent / emergent medical attention.     Recheck if worsening or not improving.       Rissa Crabtree MD          Pertinent History     The following portions of the patient's history were reviewed and updated as appropriate: allergies, current medications, past family history, past medical history, past social history, past surgical history and problem list.

## 2023-02-20 NOTE — PROGRESS NOTES
Toi is a 52 year old male who presents for a billable telephone visit.   ASSESSMENT/PLAN:  Diagnoses and all orders for this visit:    SARS-CoV-2 positive    Due to shortness of breath, COVID, and several co morbidities, advised to be seen in person to get vitals, do a lung exam and chest XR.     SUBJECTIVE:  Toi presents with reports of day 6 of COVID with worsening symptoms. He is on Paxlovid currently and is on day 3. He reports chest congestion and is concerned for pneumonia. Last night it was at its worse. His oxygen has been 93-95% at home.    ROS: Pertinent ROS neg other than the symptoms noted above in the HPI.     OBJECTIVE:  Vitals not done due to this being a virtual visit  GEN: No distress  RESP: No cough, no audible wheezing, able to talk in full sentences  Remainder of exam unable to be completed due to telephone visits    Karlo Garcia PA-C    Call length: 7 minutes  Provider location during call: Clinic  Patient location during call: Home

## 2023-02-20 NOTE — TELEPHONE ENCOUNTER
"Patient called to [facility], was transferred to [place] triage phone line.    Patient had tested positive for COVID on 2/15/23 with a home test. Symptoms started on 2/13/23.  Patient had gotten prescription from his Infectious Disease provider, was given Paxlovid on 2/17/23. Has been taking since prescribed.    Patient does not feel he is getting any better with his symptoms. Today is day 7 since onset of symptoms and patient has been on Paxlovid x 3 days now.  Continues to be getting fevers, range from  but re-occur as soon as Tylenol wears off. Fevers for 6 days straight.  Has \"bad\" cough, noticing now little blood tinge in sputum.  Not sleeping at all, having severe coughing fits at bedtime.  Lots of chest congestion, gets winded easily. Mild shortness of breath.  \"Little\" chest tightness, mid back pain with coughing and deep breaths.  Chills and shivering, off and on.   Denies wheezing, severe trouble breathing, blue or grayish lips, mouth, face, severe weakness, confusion, feeling like will pass out, high fevers.     Patient is worried about having pneumonia.    Writer advised for patient to be seen in person for further evaluation. Recommended further assessment of lungs and rule out worsening illness/pneumonia, etc.  Patient is immunocompromised, history of HIV.     Patient agreed with plans, patient is not established with primary care within [place] but has seen specialties. Patient lives in [facility]. Reviewed near by Urgent Care locations with patient. Patient voiced intention to likely proceed to [facility] location.         Graciela Saavedra RN  [facility]      "

## 2023-03-06 LAB — HBA1C MFR BLD: NORMAL %

## 2023-04-20 ENCOUNTER — PRE VISIT (OUTPATIENT)
Dept: NEUROLOGY | Facility: CLINIC | Age: 53
End: 2023-04-20

## 2023-05-21 ENCOUNTER — HEALTH MAINTENANCE LETTER (OUTPATIENT)
Age: 53
End: 2023-05-21

## 2023-06-23 ENCOUNTER — PHARMACY VISIT (OUTPATIENT)
Dept: ADMINISTRATIVE | Facility: CLINIC | Age: 53
End: 2023-06-23
Payer: COMMERCIAL

## 2023-06-23 NOTE — PROGRESS NOTES
HIV Clinical Follow Up Assessment   Completed on  14:18:53 [facility], by Inez Lin        Patient  Patient Name: TOI HOLLY  :   EHR ID: [id number]       Activity Medications    BIKTARVY        Care Details    What are the patient's goals for this therapy?   ? Undetectable      Is the patient meeting treatment goals?   ? Yes      Select patient's HIV therapy type:   ? HIV Treatment    Does the patient report viral suppression at their last lab visit?   ? Yes      Summary Notes     Spoke with Toi for assessment. Current Regimen: Biktarvy.   Med/Allergy Changes: No additional medications besides lisinopril   Tolerability: Denies SEs   Adherence: PDC = 1.00. Denies missed doses   HIV: Everything is going really well for him. Last labs were still from 2023. Mainly discussed how his insurance is ending at the end of  and he will have a new plan with [facility]. Pt will call in when he receives new insurance card so we can help triage where he can fill the Biktarvy   Follow up: Quarterly       Inez Lin, PharmD  Therapy Management Pharmacist  [facility]  [facility]

## 2023-07-14 ENCOUNTER — LAB (OUTPATIENT)
Dept: LAB | Facility: CLINIC | Age: 53
End: 2023-07-14
Payer: COMMERCIAL

## 2023-07-14 ENCOUNTER — MYC MEDICAL ADVICE (OUTPATIENT)
Dept: INFECTIOUS DISEASES | Facility: CLINIC | Age: 53
End: 2023-07-14
Payer: COMMERCIAL

## 2023-07-14 DIAGNOSIS — B20 HUMAN IMMUNODEFICIENCY VIRUS (HIV) DISEASE (H): Primary | ICD-10-CM

## 2023-07-14 DIAGNOSIS — B20 HUMAN IMMUNODEFICIENCY VIRUS (HIV) DISEASE (H): ICD-10-CM

## 2023-07-14 LAB
ALBUMIN SERPL BCG-MCNC: 4.6 G/DL (ref 3.5–5.2)
ALP SERPL-CCNC: 72 U/L (ref 40–129)
ALT SERPL W P-5'-P-CCNC: 32 U/L (ref 0–70)
ANION GAP SERPL CALCULATED.3IONS-SCNC: 12 MMOL/L (ref 7–15)
AST SERPL W P-5'-P-CCNC: 28 U/L (ref 0–45)
BASOPHILS # BLD AUTO: 0 10E3/UL (ref 0–0.2)
BASOPHILS NFR BLD AUTO: 1 %
BILIRUB SERPL-MCNC: 0.6 MG/DL
BUN SERPL-MCNC: 19 MG/DL (ref 6–20)
CALCIUM SERPL-MCNC: 9.5 MG/DL (ref 8.6–10)
CHLORIDE SERPL-SCNC: 104 MMOL/L (ref 98–107)
CREAT SERPL-MCNC: 1.26 MG/DL (ref 0.67–1.17)
DEPRECATED HCO3 PLAS-SCNC: 23 MMOL/L (ref 22–29)
EOSINOPHIL # BLD AUTO: 0.1 10E3/UL (ref 0–0.7)
EOSINOPHIL NFR BLD AUTO: 3 %
ERYTHROCYTE [DISTWIDTH] IN BLOOD BY AUTOMATED COUNT: 13 % (ref 10–15)
GFR SERPL CREATININE-BSD FRML MDRD: 69 ML/MIN/1.73M2
GLUCOSE SERPL-MCNC: 112 MG/DL (ref 70–99)
HCT VFR BLD AUTO: 45.1 % (ref 40–53)
HGB BLD-MCNC: 14.8 G/DL (ref 13.3–17.7)
IMM GRANULOCYTES # BLD: 0 10E3/UL
IMM GRANULOCYTES NFR BLD: 0 %
LYMPHOCYTES # BLD AUTO: 1.4 10E3/UL (ref 0.8–5.3)
LYMPHOCYTES NFR BLD AUTO: 30 %
MCH RBC QN AUTO: 30.4 PG (ref 26.5–33)
MCHC RBC AUTO-ENTMCNC: 32.8 G/DL (ref 31.5–36.5)
MCV RBC AUTO: 93 FL (ref 78–100)
MONOCYTES # BLD AUTO: 0.5 10E3/UL (ref 0–1.3)
MONOCYTES NFR BLD AUTO: 10 %
NEUTROPHILS # BLD AUTO: 2.7 10E3/UL (ref 1.6–8.3)
NEUTROPHILS NFR BLD AUTO: 57 %
PLATELET # BLD AUTO: 130 10E3/UL (ref 150–450)
POTASSIUM SERPL-SCNC: 4.3 MMOL/L (ref 3.4–5.3)
PROT SERPL-MCNC: 7 G/DL (ref 6.4–8.3)
RBC # BLD AUTO: 4.87 10E6/UL (ref 4.4–5.9)
SODIUM SERPL-SCNC: 139 MMOL/L (ref 136–145)
WBC # BLD AUTO: 4.7 10E3/UL (ref 4–11)

## 2023-07-14 PROCEDURE — 80053 COMPREHEN METABOLIC PANEL: CPT

## 2023-07-14 PROCEDURE — 86359 T CELLS TOTAL COUNT: CPT

## 2023-07-14 PROCEDURE — 85025 COMPLETE CBC W/AUTO DIFF WBC: CPT

## 2023-07-14 PROCEDURE — 86360 T CELL ABSOLUTE COUNT/RATIO: CPT

## 2023-07-14 PROCEDURE — 86592 SYPHILIS TEST NON-TREP QUAL: CPT

## 2023-07-14 PROCEDURE — 87536 HIV-1 QUANT&REVRSE TRNSCRPJ: CPT

## 2023-07-14 PROCEDURE — 36415 COLL VENOUS BLD VENIPUNCTURE: CPT

## 2023-07-15 LAB
CD3 CELLS # BLD: 1209 CELLS/UL (ref 603–2990)
CD3 CELLS NFR BLD: 85 % (ref 49–84)
CD3+CD4+ CELLS # BLD: 596 CELLS/UL (ref 441–2156)
CD3+CD4+ CELLS NFR BLD: 42 % (ref 28–63)
CD3+CD4+ CELLS/CD3+CD8+ CLL BLD: 0.98 % (ref 1.4–2.6)
CD3+CD8+ CELLS # BLD: 607 CELLS/UL (ref 125–1312)
CD3+CD8+ CELLS NFR BLD: 43 % (ref 10–40)
HIV1 RNA # PLAS NAA DL=20: <20 COPIES/ML
HIV1 RNA SERPL NAA+PROBE-LOG#: <1.3 {LOG_COPIES}/ML
T CELL COMMENT: ABNORMAL

## 2023-07-17 LAB — RPR SER QL: NONREACTIVE

## 2023-08-02 NOTE — PROGRESS NOTES
[facility]  Clinic Follow-Up Visit  8/3/2023        History of Present Illness:     Toi Gresham is a 52 year old y.o with a PMH of HTN, HIV who presents for follow-up. He was diagnosed with HIV in 1990 and did not start ART until about 2009 when Atripla came out. Has been on various ART regimens including Atripla, Raltegravir + Truvvada, Doluitegravir + Descovy with good control. Now doing well on Biktarvy. Reports no missed doses. Sexually active, monogamous with partner, no acute concerns for STI.     Does have headache/neck/arm pain which has previously been worked up and been attributed to cervical radiculopathy. He is interested in further evaluation of this and better symptomatic relief. He previously had a neurology appointment but cancelled it - interested in re-referral. Also interested in whether acupuncture might help so referral placed for this as well.     Also reports history of ASCUS with prior anal pap. Hasn't had follow-up in recent years.         HIV History:   Date of Diagnosis: August 1990, started ART 2009  Approximated time of transmission: Negative 2 months prior to diagnosis   CD4 Chencho: 289  Viral Load at Diagnosis: not on file   Opportunistic Infections: no h/o OI  CMV Status: not on file   Toxo Status: not on file   HLA  Status: not on file   Tuberculosis Screening: 10/28/2022   Historical use of ARVs: Atripla, Raltegravir + Truvada, Dolutegravir + Descovy   Historical Resistance Mutations: not on file        Key Prior Lab/Imaging and other data   10/28/22   Quant gold negative   VZV IgG reactive   Rubella IgG reactive   Mumps IgG reactive   Rubeola IgG reactive   HBV surface Ab reactive     9/20/22   HIV RNA quant not detected   Cr 1.22        Other Medical History:     Allergies Patient has no known allergies.    Social History  He reports that he has never smoked. He has never used smokeless tobacco. He reports current alcohol use. He reports that he does not use  drugs.   , partner works in [employer] business which is why they relocated to [place]. 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      Vaccination History:  Immunization History   Administered Date(s) Administered     COVID-19 Bivalent 12+ (Pfizer) 09/24/2022     COVID-19 MONOVALENT 12+ (Pfizer) 12/18/2020, 01/08/2021, 08/20/2021     Flu, Unspecified 01/01/2009, 09/01/2010, 11/22/2011, 08/27/2012, 11/04/2013, 10/01/2018     [id number] Novel Flu 11/18/2009     Influenza (H1N1) 11/01/2009     Influenza (IIV3) PF 10/16/2008, 10/06/2009, 09/23/2013     Influenza Vaccine 50-64 or 18-64 w/egg allergy (Flublok) 10/05/2019, 09/02/2020     Influenza Vaccine >6 months (Alfuria,Fluzone) 10/05/2019, 09/01/2020, 09/02/2020, 09/24/2022     Influenza Vaccine, 6+MO IM (QUADRIVALENT W/PRESERVATIVES) 09/12/2017     MMR 06/16/2022     Meningococcal ACWY (Menactra ) 01/26/2016, 04/28/2016     Pneumococcal 23 valent 11/23/2010, 04/24/2018     TDAP (Adacel,Boostrix) 04/28/2016     Zoster recombinant adjuvanted (SHINGRIX) 02/01/2021, 08/29/2022             Physical Exam:     VITAL SIGNS:  BP (!) 137/96 (BP Location: Right arm, Patient Position: Sitting, Cuff Size: Adult Regular)   Pulse 66   Wt 91.7 kg (202 lb 1.6 oz)   SpO2 97%   BMI 29.84 kg/m     Gen: Alert and in no distress.   Psych: Normal affect. Alert and oriented.   HEENT: PERRL. No icterus. Oropharynx pink and moist without lesions.   Neck: Supple  Chest: Normal respiratory effort.   Extremities: Warm and well perfused.   Skin: No rashes or lesions noted.           Assessment and Plan   Encouraged to establish primary care for co-management.     Human immunodeficiency virus   Excellent adherence, undetectable 9/2022. Previously followed in [place] at [facility].   -Continue Biktarvy unchanged.   - CBC, CMP  - HIV VL, T cell subset     Cervical and lumbar radiculoapthy (?)  -Had previously been seen by neurology,  referral placed for him to follow-up with them here  -Acupuncture referral placed     Hyperglycemia:   Reports history of borderline A1cs. Last A1c done here had an erroneous report and repeat wasn't drawn. Will repeat with next labs.     HTN   Refill Lisinopril 5mg    Microscopic hematuria   Reports this has been present for many years. Previously had it worked up with a renal ultrasound which was unremarkable. sCr generally ~1.2.   - Repeat UA, will monitor for proteinuria     GERD  Reflux esophagitis on EGD 2021, on omeprazole. Recurs upon stopping omeprazole.   - Continue omeprazole for now   - Recommend eventual repeat EGD    Preventative Medicine  Cardiovascular [place]:                        Lipids: 9/2021 , HDL 57, TG 54, LDL 61                       Blood Pressure: On 5mg Lisinopril   Cancer Screening:                       Colon: 8/13/2021 negative                        Anal: Reports history of ASCUS - not yet established with CRS here. Referral placed.   Immunizations:                       Hepatitis A: not on file, 12/2017 reactive IgG                       Hepatitis B:  not on file, 10/28/22 immune by serology                        Influenza: Recommended Seasonal Vaccine                       Pneumovax/Prevnar PPSV23 4/24/2018. Prevnar 20 given 8/3/23.                        Tdap: 4/28/2016                       Meningococcus: 1/26/2016, 4/28/2016. Booster given 8/3/23.  Bone Health: Patient reports possible osteopenia on previous DEXA. Will bring results to next appt  Renal Health: Cr: 1.22, UA done 1/2021 with small blood, reports h/o unremarkable renal US   Sexually Transmitted Infection Risk and Screening:                       Gonorrhea and Chlamydia: not detected 9/2021                        Syphilis: RPR negative 9/2021     Patient reports no need for re-screening today.               RTC 6 months     Alison Jean MD  Infectious Diseases  Pager [phone]

## 2023-08-03 ENCOUNTER — OFFICE VISIT (OUTPATIENT)
Dept: INFECTIOUS DISEASES | Facility: CLINIC | Age: 53
End: 2023-08-03
Attending: INTERNAL MEDICINE
Payer: COMMERCIAL

## 2023-08-03 VITALS
WEIGHT: 202.1 LBS | OXYGEN SATURATION: 97 % | BODY MASS INDEX: 29.84 KG/M2 | HEART RATE: 66 BPM | DIASTOLIC BLOOD PRESSURE: 96 MMHG | SYSTOLIC BLOOD PRESSURE: 137 MMHG

## 2023-08-03 DIAGNOSIS — M54.10 RADICULOPATHY AFFECTING UPPER EXTREMITY: ICD-10-CM

## 2023-08-03 DIAGNOSIS — R85.610 PAP SMEAR OF ANUS WITH ASCUS: ICD-10-CM

## 2023-08-03 DIAGNOSIS — R73.9 HYPERGLYCEMIA: ICD-10-CM

## 2023-08-03 DIAGNOSIS — B20 HUMAN IMMUNODEFICIENCY VIRUS (HIV) DISEASE (H): Primary | ICD-10-CM

## 2023-08-03 PROCEDURE — 90471 IMMUNIZATION ADMIN: CPT | Performed by: INTERNAL MEDICINE

## 2023-08-03 PROCEDURE — 90472 IMMUNIZATION ADMIN EACH ADD: CPT | Performed by: INTERNAL MEDICINE

## 2023-08-03 PROCEDURE — 250N000011 HC RX IP 250 OP 636: Performed by: INTERNAL MEDICINE

## 2023-08-03 PROCEDURE — G0009 ADMIN PNEUMOCOCCAL VACCINE: HCPCS | Performed by: INTERNAL MEDICINE

## 2023-08-03 PROCEDURE — 90619 MENACWY-TT VACCINE IM: CPT | Performed by: INTERNAL MEDICINE

## 2023-08-03 PROCEDURE — 250N000021 HC RX MED A9270 GY (STAT IND- M) 250: Performed by: INTERNAL MEDICINE

## 2023-08-03 PROCEDURE — 99215 OFFICE O/P EST HI 40 MIN: CPT | Performed by: INTERNAL MEDICINE

## 2023-08-03 PROCEDURE — 90677 PCV20 VACCINE IM: CPT | Performed by: INTERNAL MEDICINE

## 2023-08-03 PROCEDURE — 99212 OFFICE O/P EST SF 10 MIN: CPT | Mod: 25 | Performed by: INTERNAL MEDICINE

## 2023-08-03 RX ADMIN — NEISSERIA MENINGITIDIS GROUP A CAPSULAR POLYSACCHARIDE TETANUS TOXOID CONJUGATE ANTIGEN, NEISSERIA MENINGITIDIS GROUP C CAPSULAR POLYSACCHARIDE TETANUS TOXOID CONJUGATE ANTIGEN, NEISSERIA MENINGITIDIS GROUP Y CAPSULAR POLYSACCHARIDE TETANUS TOXOID CONJUGATE ANTIGEN, AND NEISSERIA MENINGITIDIS GROUP W-135 CAPSULAR POLYSACCHARIDE TETANUS TOXOID CONJUGATE ANTIGEN 0.5 ML: 10; 10; 10; 10 INJECTION, SOLUTION INTRAMUSCULAR at 10:06

## 2023-08-03 RX ADMIN — PNEUMOCOCCAL 20-VALENT CONJUGATE VACCINE 0.5 ML
2.2; 2.2; 2.2; 2.2; 2.2; 2.2; 2.2; 2.2; 2.2; 2.2; 2.2; 2.2; 2.2; 2.2; 2.2; 2.2; 4.4; 2.2; 2.2; 2.2 INJECTION, SUSPENSION INTRAMUSCULAR at 10:08

## 2023-08-03 ASSESSMENT — PAIN SCALES - GENERAL: PAINLEVEL: MILD PAIN (3)

## 2023-08-03 NOTE — LETTER
8/3/2023       RE: Toi Gresham  [address] [facility] [address]     Dear Colleague,    Thank you for referring your patient, Toi Gresham, to the [facility] DISE[facility]INIC [place] at [facility]. Please see a copy of my visit note below.    [facility]  Clinic Follow-Up Visit  8/3/2023        History of Present Illness:     Toi Gresham is a 52 year old y.o with a PMH of HTN, HIV who presents for follow-up. He was diagnosed with HIV in 1990 and did not start ART until about 2009 when Atripla came out. Has been on various ART regimens including Atripla, Raltegravir + Truvvada, Doluitegravir + Descovy with good control. Now doing well on Biktarvy. Reports no missed doses. Sexually active, monogamous with partner, no acute concerns for STI.     Does have headache/neck/arm pain which has previously been worked up and been attributed to cervical radiculopathy. He is interested in further evaluation of this and better symptomatic relief. He previously had a neurology appointment but cancelled it - interested in re-referral. Also interested in whether acupuncture might help so referral placed for this as well.     Also reports history of ASCUS with prior anal pap. Hasn't had follow-up in recent years.         HIV History:   Date of Diagnosis: August 1990, started ART 2009  Approximated time of transmission: Negative 2 months prior to diagnosis   CD4 Chencho: 289  Viral Load at Diagnosis: not on file   Opportunistic Infections: no h/o OI  CMV Status: not on file   Toxo Status: not on file   HLA  Status: not on file   Tuberculosis Screening: 10/28/2022   Historical use of ARVs: Atripla, Raltegravir + Truvada, Dolutegravir + Descovy   Historical Resistance Mutations: not on file        Key Prior Lab/Imaging and other data   10/28/22   Quant gold negative   VZV IgG reactive   Rubella IgG reactive   Mumps IgG reactive   Rubeola IgG reactive    HBV surface Ab reactive     9/20/22   HIV RNA quant not detected   Cr 1.22        Other Medical History:     Allergies Patient has no known allergies.    Social History  He reports that he has never smoked. He has never used smokeless tobacco. He reports current alcohol use. He reports that he does not use drugs.   , partner works in [employer] business which is why they relocated to [place]. 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      Vaccination History:  Immunization History   Administered Date(s) Administered    COVID-19 Bivalent 12+ (Pfizer) 09/24/2022    COVID-19 MONOVALENT 12+ (Pfizer) 12/18/2020, 01/08/2021, 08/20/2021    Flu, Unspecified 01/01/2009, 09/01/2010, 11/22/2011, 08/27/2012, 11/04/2013, 10/01/2018    H8a4-64 Novel Flu 11/18/2009    Influenza (H1N1) 11/01/2009    Influenza (IIV3) PF 10/16/2008, 10/06/2009, 09/23/2013    Influenza Vaccine 50-64 or 18-64 w/egg allergy (Flublok) 10/05/2019, 09/02/2020    Influenza Vaccine >6 months (Alfuria,Fluzone) 10/05/2019, 09/01/2020, 09/02/2020, 09/24/2022    Influenza Vaccine, 6+MO IM (QUADRIVALENT W/PRESERVATIVES) 09/12/2017    MMR 06/16/2022    Meningococcal ACWY (Menactra ) 01/26/2016, 04/28/2016    Pneumococcal 23 valent 11/23/2010, 04/24/2018    TDAP (Adacel,Boostrix) 04/28/2016    Zoster recombinant adjuvanted (SHINGRIX) 02/01/2021, 08/29/2022             Physical Exam:     VITAL SIGNS:  BP (!) 137/96 (BP Location: Right arm, Patient Position: Sitting, Cuff Size: Adult Regular)   Pulse 66   Wt 91.7 kg (202 lb 1.6 oz)   SpO2 97%   BMI 29.84 kg/m     Gen: Alert and in no distress.   Psych: Normal affect. Alert and oriented.   HEENT: PERRL. No icterus. Oropharynx pink and moist without lesions.   Neck: Supple  Chest: Normal respiratory effort.   Extremities: Warm and well perfused.   Skin: No rashes or lesions noted.           Assessment and Plan   Encouraged to establish primary care for co-management.      Human immunodeficiency virus   Excellent adherence, undetectable 9/2022. Previously followed in [place] at [facility].   -Continue Biktarvy unchanged.   - CBC, CMP  - HIV VL, T cell subset     Cervical and lumbar radiculoapthy (?)  -Had previously been seen by neurology, referral placed for him to follow-up with them here  -Acupuncture referral placed     Hyperglycemia:   Reports history of borderline A1cs. Last A1c done here had an erroneous report and repeat wasn't drawn. Will repeat with next labs.     HTN   Refill Lisinopril 5mg    Microscopic hematuria   Reports this has been present for many years. Previously had it worked up with a renal ultrasound which was unremarkable. sCr generally ~1.2.   - Repeat UA, will monitor for proteinuria     GERD  Reflux esophagitis on EGD 2021, on omeprazole. Recurs upon stopping omeprazole.   - Continue omeprazole for now   - Recommend eventual repeat EGD    Preventative Medicine  Cardiovascular [place]:                        Lipids: 9/2021 , HDL 57, TG 54, LDL 61                       Blood Pressure: On 5mg Lisinopril   Cancer Screening:                       Colon: 8/13/2021 negative                        Anal: Reports history of ASCUS - not yet established with CRS here. Referral placed.   Immunizations:                       Hepatitis A: not on file, 12/2017 reactive IgG                       Hepatitis B:  not on file, 10/28/22 immune by serology                        Influenza: Recommended Seasonal Vaccine                       Pneumovax/Prevnar PPSV23 4/24/2018. Prevnar 20 given 8/3/23.                        Tdap: 4/28/2016                       Meningococcus: 1/26/2016, 4/28/2016. Booster given 8/3/23.  Bone Health: Patient reports possible osteopenia on previous DEXA. Will bring results to next appt  Renal Health: Cr: 1.22, UA done 1/2021 with small blood, reports h/o unremarkable renal US   Sexually Transmitted Infection Risk and  Screening:                       Gonorrhea and Chlamydia: not detected 9/2021                        Syphilis: RPR negative 9/2021     Patient reports no need for re-screening today.               RTC 6 months     Alison Jean MD  Infectious Diseases  Pager [phone]

## 2023-08-03 NOTE — NURSING NOTE
Chief Complaint   Patient presents with    RECHECK     b20 Follow up     BP (!) 137/96 (BP Location: Right arm, Patient Position: Sitting, Cuff Size: Adult Regular)   Pulse 66   Wt 91.7 kg (202 lb 1.6 oz)   SpO2 97%   BMI 29.84 kg/m    Lina STEPHEN

## 2023-08-03 NOTE — PATIENT INSTRUCTIONS
I found out we can put in a routine referral for acupuncture (!) so I put in the referral if you'd like to try it. From the list I was given [facility] looked like the best fit for you but you can ask about closer locations if you talk to them to schedule  We will recheck A1c and urinalysis with next labs.   I put in another neurology referral for you  I put in a colorectal surgery consult for you. You should be scheduled with either Dr. Maguire or Lina Sanchez.   You bring DEXA result to next appt  Consider [facility]  Today - Prevnar 20 and Menquadfi (meningococcal booster recommended every 5 years)

## 2023-08-04 ENCOUNTER — TELEPHONE (OUTPATIENT)
Dept: INFECTIOUS DISEASES | Facility: CLINIC | Age: 53
End: 2023-08-04
Payer: COMMERCIAL

## 2023-08-04 NOTE — TELEPHONE ENCOUNTER
EP called 8/4 to sched a 6 month (around 2/3/24) follow up with Dr. Jean per checkout notes from 8/3. Patient said he will call at a later time to sched once it gets closer to the time.

## 2023-08-07 ENCOUNTER — TELEPHONE (OUTPATIENT)
Dept: SURGERY | Facility: CLINIC | Age: 53
End: 2023-08-07
Payer: COMMERCIAL

## 2023-08-07 NOTE — TELEPHONE ENCOUNTER
Spoke with pt, he stated that he didn't know about the referral from his primary care provider. He stated that he will reach to them and then get back to us.

## 2023-09-06 ENCOUNTER — OFFICE VISIT (OUTPATIENT)
Dept: FAMILY MEDICINE | Facility: CLINIC | Age: 53
End: 2023-09-06
Payer: COMMERCIAL

## 2023-09-06 VITALS
TEMPERATURE: 98.6 F | HEART RATE: 78 BPM | SYSTOLIC BLOOD PRESSURE: 103 MMHG | DIASTOLIC BLOOD PRESSURE: 73 MMHG | RESPIRATION RATE: 16 BRPM | OXYGEN SATURATION: 96 %

## 2023-09-06 DIAGNOSIS — K59.00 CONSTIPATION, UNSPECIFIED CONSTIPATION TYPE: ICD-10-CM

## 2023-09-06 DIAGNOSIS — K21.00 GASTROESOPHAGEAL REFLUX DISEASE WITH ESOPHAGITIS WITHOUT HEMORRHAGE: ICD-10-CM

## 2023-09-06 DIAGNOSIS — R10.13 EPIGASTRIC PAIN: Primary | ICD-10-CM

## 2023-09-06 PROCEDURE — 99213 OFFICE O/P EST LOW 20 MIN: CPT | Performed by: STUDENT IN AN ORGANIZED HEALTH CARE EDUCATION/TRAINING PROGRAM

## 2023-09-06 RX ORDER — OMEPRAZOLE 40 MG/1
40 CAPSULE, DELAYED RELEASE ORAL DAILY
Qty: 30 CAPSULE | Refills: 0 | Status: SHIPPED | OUTPATIENT
Start: 2023-09-06 | End: 2023-10-05 | Stop reason: DRUGHIGH

## 2023-09-06 RX ORDER — SUCRALFATE 1 G/1
1 TABLET ORAL 4 TIMES DAILY
Qty: 120 TABLET | Refills: 0 | Status: SHIPPED | OUTPATIENT
Start: 2023-09-06 | End: 2023-10-05

## 2023-09-06 RX ORDER — POLYETHYLENE GLYCOL 3350 17 G/17G
1 POWDER, FOR SOLUTION ORAL DAILY
Qty: 510 G | Refills: 0 | Status: SHIPPED | OUTPATIENT
Start: 2023-09-06

## 2023-09-06 NOTE — PATIENT INSTRUCTIONS
[facility] in [place], [place]  Get online care: [email or web address]  Address: [address] [place] [id number]  [phone]    Dr. Danilo Cohen

## 2023-09-06 NOTE — PROGRESS NOTES
Assessment & Plan     Epigastric pain  Gastroesophageal reflux disease with esophagitis without hemorrhage  Symptoms seem consistent with peptic ulcer disease. He has been on PPI for a long time. Discussed need to stop for 2 weeks prior to H pylori testing. Plan to trial increased dosage to 40 mg and carafate to see if any improvement. If not, recommended to follow up with PCP for further work up/h pylori testing.  - omeprazole (PRILOSEC) 40 MG DR capsule; Take 1 capsule (40 mg) by mouth daily for 30 days  - sucralfate (CARAFATE) 1 GM tablet; Take 1 tablet (1 g) by mouth 4 times daily for 30 days    Constipation, unspecified constipation type  Has had a few days of constipation, likely making indigestion worse.   - polyethylene glycol (MIRALAX) 17 GM/Dose powder; Take 17 g (1 Capful) by mouth daily       Debbie Root MD  [facility]    Nat Cm is a 52 year old, presenting for the following health issues:  GI Problem (X 4 days, a lot of burning and feeling bloated, constipation, sweating, no fever (has not taken temp), no diarrhea, tried omeprazole few days and Pepto bismo with no improvement)         No data to display                HPI     Abd pain  - 4 days has had nausea, burning, bloating and indigestion as well as constipation  - located in the upper gastric area  - not gotten better  - has been taking 20 mg omeprazole that hasn't helped, however this morning he took 40.  - worse with lying down flat  - also noticing sweatiness, gurgling sounds, indigestion  - has been about 3 days that he's not had a bm  - not really pain, but gnawing/burning sensation  - also having heartburn on and off  - nausea, no vomiting, no black or bloody stools  - never had an episode like this before.   - no previous history of ulcers  - had an UGI and colonoscopy 2 years ago, c scope fine, ugi showed esophagitis.    Precipitating factors:   Caffeine use: No - constant coffee drinker and  stopped about 4-5 days ago. 2 cups a day every day prior to that.  Alcohol use: YES- 6-7 drinks a week  NSAID/Aspirin use: No  Tobacco use: No  Worse with lying flat, any food.  Alleviating factors: getting up and walking around.  Therapies tried and outcome:             Lifestyle changes: diet, caffeine            Medications: Omeprazole (Prilosec)      Review of Systems   Constitutional, HEENT, cardiovascular, pulmonary, gi and gu systems are negative, except as otherwise noted.      Objective    /73   Pulse 78   Temp 98.6  F (37  C) (Oral)   Resp 16   SpO2 96%   There is no height or weight on file to calculate BMI.  Physical Exam   GENERAL: healthy, alert and no distress  RESP: lungs clear to auscultation - no rales, rhonchi or wheezes  CV: regular rate and rhythm, normal S1 S2, no S3 or S4, no murmur, click or rub, no peripheral edema and peripheral pulses strong  ABDOMEN: soft, nd, ttp in epigastrium  MS: no gross musculoskeletal defects noted, no edema

## 2023-09-10 ENCOUNTER — MYC MEDICAL ADVICE (OUTPATIENT)
Dept: INFECTIOUS DISEASES | Facility: CLINIC | Age: 53
End: 2023-09-10
Payer: COMMERCIAL

## 2023-09-11 ENCOUNTER — HOSPITAL ENCOUNTER (EMERGENCY)
Facility: CLINIC | Age: 53
Discharge: HOME | End: 2023-09-11
Attending: EMERGENCY MEDICINE | Admitting: EMERGENCY MEDICINE
Payer: COMMERCIAL

## 2023-09-11 ENCOUNTER — APPOINTMENT (OUTPATIENT)
Dept: CT IMAGING | Facility: CLINIC | Age: 53
End: 2023-09-11
Attending: EMERGENCY MEDICINE
Payer: COMMERCIAL

## 2023-09-11 VITALS
OXYGEN SATURATION: 98 % | RESPIRATION RATE: 16 BRPM | TEMPERATURE: 98.3 F | DIASTOLIC BLOOD PRESSURE: 81 MMHG | SYSTOLIC BLOOD PRESSURE: 116 MMHG | HEART RATE: 87 BPM

## 2023-09-11 DIAGNOSIS — K57.32 DIVERTICULITIS OF COLON: ICD-10-CM

## 2023-09-11 LAB
ALBUMIN SERPL BCG-MCNC: 4.6 G/DL (ref 3.5–5.2)
ALBUMIN UR-MCNC: NEGATIVE MG/DL
ALP SERPL-CCNC: 68 U/L (ref 40–129)
ALT SERPL W P-5'-P-CCNC: 26 U/L (ref 0–70)
ANION GAP SERPL CALCULATED.3IONS-SCNC: 11 MMOL/L (ref 7–15)
APPEARANCE UR: CLEAR
AST SERPL W P-5'-P-CCNC: ABNORMAL U/L
BASOPHILS # BLD AUTO: 0.1 10E3/UL (ref 0–0.2)
BASOPHILS NFR BLD AUTO: 1 %
BILIRUB SERPL-MCNC: 0.9 MG/DL
BILIRUB UR QL STRIP: NEGATIVE
BUN SERPL-MCNC: 19.4 MG/DL (ref 6–20)
BURR CELLS BLD QL SMEAR: SLIGHT
CALCIUM SERPL-MCNC: 9.7 MG/DL (ref 8.6–10)
CHLORIDE SERPL-SCNC: 104 MMOL/L (ref 98–107)
COLOR UR AUTO: YELLOW
CREAT SERPL-MCNC: 1.19 MG/DL (ref 0.67–1.17)
DEPRECATED HCO3 PLAS-SCNC: 22 MMOL/L (ref 22–29)
EGFRCR SERPLBLD CKD-EPI 2021: 73 ML/MIN/1.73M2
EOSINOPHIL # BLD AUTO: 0.1 10E3/UL (ref 0–0.7)
EOSINOPHIL NFR BLD AUTO: 2 %
ERYTHROCYTE [DISTWIDTH] IN BLOOD BY AUTOMATED COUNT: 12.4 % (ref 10–15)
GLUCOSE SERPL-MCNC: 115 MG/DL (ref 70–99)
GLUCOSE UR STRIP-MCNC: NEGATIVE MG/DL
HCT VFR BLD AUTO: 49.2 % (ref 40–53)
HGB BLD-MCNC: 16.7 G/DL (ref 13.3–17.7)
HGB UR QL STRIP: ABNORMAL
IMM GRANULOCYTES # BLD: 0 10E3/UL
IMM GRANULOCYTES NFR BLD: 0 %
KETONES UR STRIP-MCNC: NEGATIVE MG/DL
LEUKOCYTE ESTERASE UR QL STRIP: NEGATIVE
LIPASE SERPL-CCNC: 35 U/L (ref 13–60)
LYMPHOCYTES # BLD AUTO: 1.5 10E3/UL (ref 0.8–5.3)
LYMPHOCYTES NFR BLD AUTO: 25 %
MCH RBC QN AUTO: 30.8 PG (ref 26.5–33)
MCHC RBC AUTO-ENTMCNC: 33.9 G/DL (ref 31.5–36.5)
MCV RBC AUTO: 91 FL (ref 78–100)
MONOCYTES # BLD AUTO: 0.5 10E3/UL (ref 0–1.3)
MONOCYTES NFR BLD AUTO: 9 %
MUCOUS THREADS #/AREA URNS LPF: PRESENT /LPF
NEUTROPHILS # BLD AUTO: 3.7 10E3/UL (ref 1.6–8.3)
NEUTROPHILS NFR BLD AUTO: 63 %
NITRATE UR QL: NEGATIVE
NRBC # BLD AUTO: 0 10E3/UL
NRBC BLD AUTO-RTO: 0 /100
PH UR STRIP: 5 [PH] (ref 5–7)
PLAT MORPH BLD: ABNORMAL
PLATELET # BLD AUTO: 139 10E3/UL (ref 150–450)
POTASSIUM SERPL-SCNC: 5.2 MMOL/L (ref 3.4–5.3)
PROT SERPL-MCNC: 7.6 G/DL (ref 6.4–8.3)
RADIOLOGIST FLAGS: ABNORMAL
RBC # BLD AUTO: 5.43 10E6/UL (ref 4.4–5.9)
RBC MORPH BLD: ABNORMAL
RBC URINE: 4 /HPF
SODIUM SERPL-SCNC: 137 MMOL/L (ref 136–145)
SP GR UR STRIP: 1.02 (ref 1–1.03)
TRANSITIONAL EPI: <1 /HPF
UROBILINOGEN UR STRIP-MCNC: NORMAL MG/DL
WBC # BLD AUTO: 5.9 10E3/UL (ref 4–11)
WBC URINE: <1 /HPF

## 2023-09-11 PROCEDURE — 96361 HYDRATE IV INFUSION ADD-ON: CPT | Performed by: EMERGENCY MEDICINE

## 2023-09-11 PROCEDURE — 74177 CT ABD & PELVIS W/CONTRAST: CPT

## 2023-09-11 PROCEDURE — 81001 URINALYSIS AUTO W/SCOPE: CPT | Performed by: EMERGENCY MEDICINE

## 2023-09-11 PROCEDURE — 96367 TX/PROPH/DG ADDL SEQ IV INF: CPT | Performed by: EMERGENCY MEDICINE

## 2023-09-11 PROCEDURE — 83690 ASSAY OF LIPASE: CPT | Performed by: EMERGENCY MEDICINE

## 2023-09-11 PROCEDURE — 250N000011 HC RX IP 250 OP 636: Mod: JZ | Performed by: EMERGENCY MEDICINE

## 2023-09-11 PROCEDURE — 99285 EMERGENCY DEPT VISIT HI MDM: CPT | Mod: 25 | Performed by: EMERGENCY MEDICINE

## 2023-09-11 PROCEDURE — 96368 THER/DIAG CONCURRENT INF: CPT | Performed by: EMERGENCY MEDICINE

## 2023-09-11 PROCEDURE — 250N000011 HC RX IP 250 OP 636: Performed by: EMERGENCY MEDICINE

## 2023-09-11 PROCEDURE — 258N000003 HC RX IP 258 OP 636: Performed by: EMERGENCY MEDICINE

## 2023-09-11 PROCEDURE — 84155 ASSAY OF PROTEIN SERUM: CPT | Performed by: EMERGENCY MEDICINE

## 2023-09-11 PROCEDURE — 36415 COLL VENOUS BLD VENIPUNCTURE: CPT | Performed by: EMERGENCY MEDICINE

## 2023-09-11 PROCEDURE — 85004 AUTOMATED DIFF WBC COUNT: CPT | Performed by: EMERGENCY MEDICINE

## 2023-09-11 PROCEDURE — 96365 THER/PROPH/DIAG IV INF INIT: CPT | Performed by: EMERGENCY MEDICINE

## 2023-09-11 PROCEDURE — 96366 THER/PROPH/DIAG IV INF ADDON: CPT | Performed by: EMERGENCY MEDICINE

## 2023-09-11 PROCEDURE — 96375 TX/PRO/DX INJ NEW DRUG ADDON: CPT | Performed by: EMERGENCY MEDICINE

## 2023-09-11 PROCEDURE — 74177 CT ABD & PELVIS W/CONTRAST: CPT | Mod: 26 | Performed by: STUDENT IN AN ORGANIZED HEALTH CARE EDUCATION/TRAINING PROGRAM

## 2023-09-11 PROCEDURE — 99284 EMERGENCY DEPT VISIT MOD MDM: CPT | Performed by: EMERGENCY MEDICINE

## 2023-09-11 RX ORDER — IOPAMIDOL 755 MG/ML
124 INJECTION, SOLUTION INTRAVASCULAR ONCE
Status: COMPLETED | OUTPATIENT
Start: 2023-09-11 | End: 2023-09-11

## 2023-09-11 RX ORDER — CIPROFLOXACIN 2 MG/ML
400 INJECTION, SOLUTION INTRAVENOUS ONCE
Status: DISCONTINUED | OUTPATIENT
Start: 2023-09-11 | End: 2023-09-11

## 2023-09-11 RX ORDER — METRONIDAZOLE 500 MG/100ML
500 INJECTION, SOLUTION INTRAVENOUS ONCE
Status: COMPLETED | OUTPATIENT
Start: 2023-09-11 | End: 2023-09-11

## 2023-09-11 RX ORDER — CIPROFLOXACIN 500 MG/1
500 TABLET, FILM COATED ORAL 2 TIMES DAILY
Qty: 28 TABLET | Refills: 0 | Status: SHIPPED | OUTPATIENT
Start: 2023-09-11 | End: 2023-09-25

## 2023-09-11 RX ORDER — KETOROLAC TROMETHAMINE 15 MG/ML
15 INJECTION, SOLUTION INTRAMUSCULAR; INTRAVENOUS ONCE
Status: COMPLETED | OUTPATIENT
Start: 2023-09-11 | End: 2023-09-11

## 2023-09-11 RX ORDER — SIMETHICONE 80 MG
80 TABLET,CHEWABLE ORAL EVERY 6 HOURS PRN
Qty: 20 TABLET | Refills: 0 | Status: SHIPPED | OUTPATIENT
Start: 2023-09-11 | End: 2023-09-19

## 2023-09-11 RX ORDER — ONDANSETRON 2 MG/ML
4 INJECTION INTRAMUSCULAR; INTRAVENOUS EVERY 30 MIN PRN
Status: DISCONTINUED | OUTPATIENT
Start: 2023-09-11 | End: 2023-09-11 | Stop reason: HOSPADM

## 2023-09-11 RX ORDER — METRONIDAZOLE 500 MG/1
500 TABLET ORAL 2 TIMES DAILY
Qty: 28 TABLET | Refills: 0 | Status: SHIPPED | OUTPATIENT
Start: 2023-09-11 | End: 2023-09-25

## 2023-09-11 RX ORDER — ONDANSETRON 4 MG/1
4 TABLET, ORALLY DISINTEGRATING ORAL EVERY 8 HOURS PRN
Qty: 10 TABLET | Refills: 0 | Status: SHIPPED | OUTPATIENT
Start: 2023-09-11 | End: 2023-09-14

## 2023-09-11 RX ORDER — CIPROFLOXACIN 2 MG/ML
400 INJECTION, SOLUTION INTRAVENOUS ONCE
Status: COMPLETED | OUTPATIENT
Start: 2023-09-11 | End: 2023-09-11

## 2023-09-11 RX ADMIN — CIPROFLOXACIN 400 MG: 2 INJECTION, SOLUTION INTRAVENOUS at 16:24

## 2023-09-11 RX ADMIN — ONDANSETRON 4 MG: 2 INJECTION INTRAMUSCULAR; INTRAVENOUS at 13:02

## 2023-09-11 RX ADMIN — KETOROLAC TROMETHAMINE 15 MG: 15 INJECTION, SOLUTION INTRAMUSCULAR; INTRAVENOUS at 13:02

## 2023-09-11 RX ADMIN — IOPAMIDOL 124 ML: 755 INJECTION, SOLUTION INTRAVENOUS at 13:43

## 2023-09-11 RX ADMIN — METRONIDAZOLE 500 MG: 500 INJECTION, SOLUTION INTRAVENOUS at 15:03

## 2023-09-11 RX ADMIN — SODIUM CHLORIDE 1000 ML: 9 INJECTION, SOLUTION INTRAVENOUS at 13:01

## 2023-09-11 ASSESSMENT — ACTIVITIES OF DAILY LIVING (ADL)
ADLS_ACUITY_SCORE: 35

## 2023-09-11 NOTE — ED TRIAGE NOTES
PT arrives with nausea, constipation and bloating for 8 days. No vomiting. He says prior to this he was feeling fine. He was referred by his PCP with concerns of a bowel obstruction. He has a hx of HIV. He has not experienced this before.

## 2023-09-11 NOTE — ED PROVIDER NOTES
ED Provider Note  [facility]      History   No chief complaint on file.    The history is provided by the patient and medical records.     Toi Gresham is a 52 year old male with a history of DM II, HTN and HIV who presents to the ED for evaluation of nausea and abdominal pain. The patient has been nauseous for the past 8 days.  He also complains of epigastric abdominal pain, bloating and constipation.  Patient reports eating less than normal lately as it worsens his nausea.  He describes his abdominal pain as indigestion pain.  He denies experiencing similar pain in the past.  Patient reports his last bowel movement occurred 3 days ago.  He felt he had to restrain and was not able to completely pass his bowels.  He notes not being able to pass gas over the past several days.  He has had no difficulty urinating.  The patient denies using medication to relieve his constipation.  He states he is typically very regular and is not usually constipated.  Patient states he was eating more barbecue prior to his onset and initially thought it was due to potential food poisoning.  Patient denies the use of alcohol since the onset of his nausea and abdominal pain.  He denies excessive use of alcohol recently.  He denies the use of marijuana or other illicit substances including IV drug use.  Patient denies a history of abdominal procedures or history of hernias.  Patient denies a history of AIDS.  Per review of patient's records, patient's HIV-1 RNA value less than 20 from last result on 7/14/2023. Patient attended an appointment with his PCP on 9/6 and was prescribed Miralax, Carafate as well as increased his dose of Omeprazole from 20 mg to 40 mg daily. He denies a change in his symptoms since increasing his Omeprazole.     Past Medical History  No past medical history on file.  No past surgical history on file.  bictegravir-emtricitabine-tenofovir (BIKTARVY) -25 MG per tablet  lisinopril  (ZESTRIL) 5 MG tablet  omeprazole (PRILOSEC) 20 MG DR capsule  omeprazole (PRILOSEC) 40 MG DR capsule  polyethylene glycol (MIRALAX) 17 GM/Dose powder  sucralfate (CARAFATE) 1 GM tablet      No Known Allergies  Family History  No family history on file.  Social History   Social History     Tobacco Use    Smoking status: Never    Smokeless tobacco: Never   Substance Use Topics    Alcohol use: Yes     Comment: 4 to 5 drinks weekly    Drug use: Never      Past medical history, past surgical history, medications, allergies, family history, and social history were reviewed with the patient. No additional pertinent items.      A complete review of systems was performed with pertinent positives and negatives noted in the HPI, and all other systems negative.    Physical Exam      Physical Exam  Constitutional:       General: He is not in acute distress.     Appearance: He is well-developed. He is not ill-appearing, toxic-appearing or diaphoretic.   HENT:      Head: Normocephalic and atraumatic.   Cardiovascular:      Rate and Rhythm: Normal rate and regular rhythm.      Heart sounds: Normal heart sounds.   Pulmonary:      Effort: Pulmonary effort is normal. No respiratory distress.      Breath sounds: No wheezing.   Abdominal:      General: There is no distension.      Palpations: Abdomen is soft.      Tenderness: There is abdominal tenderness (mild diffuse abdominal pain; no rebound or guarding). There is no rebound.   Musculoskeletal:      Cervical back: Normal range of motion and neck supple.   Skin:     General: Skin is warm.   Neurological:      Mental Status: He is alert and oriented to person, place, and time.   Psychiatric:         Behavior: Behavior normal.         Thought Content: Thought content normal.           ED Course, Procedures, & Data    12:16 PM  The patient was seen and examined by Dr. Lakeshia Nj in Room VTA.     Procedures       Results for orders placed or performed during the hospital encounter of  09/11/23   CT Abdomen Pelvis w Contrast     Status: Abnormal   Result Value Ref Range    Radiologist flags Questionable mild component of ascending (Urgent)     Narrative    EXAM: CT ABDOMEN PELVIS W CONTRAST  9/11/2023 2:03 PM     HISTORY:  abd pain, constipation; nausea       COMPARISON:  None    TECHNIQUE: Helical technique CT abdomen and pelvis with IV contrast  (124 cc IV Isovue 370); axial slices with sagittal and coronal  reconstructions. Total DLP: 557 mGy*cm.    FINDINGS:    Lower chest: Subpleural nodule in the left lower lobe measuring 3 mm  (series 4, image 6). 5 mm nodule along the right oblique fissure  (series 4, image 60). Additional 2 mm nodule in the right lower lobe  (series 4, image 55). No pleural effusion. No pneumothorax. No  pericardial effusion.    ABDOMEN:      Liver: Diffuse hepatic hypoattenuation compatible with hepatic  steatosis. Decreased the sensitivity for focal liver lesion. Apparent  geographic increased density in the pericholecystic region likely  secondary to focal fatty sparing. No intrahepatic biliary ductal  dilatation.    Gallbladder: Calcified gallstone without acute cholecystitis.    Pancreas: Normal in appearance without main ductal dilatation.    Spleen: Borderline splenomegaly with the spleen measuring 13.1 cm.    Adrenal glands: Mild nodular thickening of the left adrenal gland. No  mass.    Kidneys/ureters/bladder: No hydronephrosis. Exophytic right renal cyst  measuring up to 4.6 cm. Bilateral too small to characterize renal  hypodensities favored to represent cyst. Punctate prostatic  calcification.    Bowel: No significant small bowel dilatation. Chronic diverticulosis.  Impression mild pericolonic fatty streakiness along the cecum and  ascending colon. Appendix within normal limits. Subcentimeter  mesenteric lymph node in the right upper quadrant.    No pneumoperitoneum or free fluid in the pelvis.    No significant retroperitoneal lymphadenopathy. Prominent  right  inguinal lymph node largest measuring up to 12 mm in short axis  (series 4, image 157).      BONES AND SOFT TISSUES:    No acute osseous abnormality. Mild degenerative changes of the  visualized spine. Small fat-containing umbilical hernia.        Impression    IMPRESSION:      1. No high-grade bowel obstruction as clinically queried.   2. Questionable pericolonic streakiness along the cecum and ascending  colon, colitis/diverticulitis not excluded.   3. Cholelithiasis without cholecystitis.  4. Diffuse hepatic steatosis.  5. Mild splenomegaly.  6. Enlarged right inguinal lymph node, possibly reactive.  7. Indeterminate subcentimeter pulmonary nodules. If patient is at  high risk for lung cancer consider follow-up CT chest as per  Fleischner Society guidelines.      [Urgent Result: Questionable mild component of ascending  colitis/diverticulitis]    Finding was identified on 9/11/2023 2:06 PM.     Dr. Nj was contacted by Dr. Sullivan at 9/11/2023 2:34 PM and  verbalized understanding of the urgent finding.     I have personally reviewed the examination and initial interpretation  and I agree with the findings.    SANTOSH OLMSTEAD MD         SYSTEM ID:  [id number]   Comprehensive metabolic panel     Status: Abnormal   Result Value Ref Range    Sodium 137 136 - 145 mmol/L    Potassium 5.2 3.4 - 5.3 mmol/L    Chloride 104 98 - 107 mmol/L    Carbon Dioxide (CO2) 22 22 - 29 mmol/L    Anion Gap 11 7 - 15 mmol/L    Urea Nitrogen 19.4 6.0 - 20.0 mg/dL    Creatinine 1.19 (H) 0.67 - 1.17 mg/dL    Calcium 9.7 8.6 - 10.0 mg/dL    Glucose 115 (H) 70 - 99 mg/dL    Alkaline Phosphatase 68 40 - 129 U/L    AST      ALT 26 0 - 70 U/L    Protein Total 7.6 6.4 - 8.3 g/dL    Albumin 4.6 3.5 - 5.2 g/dL    Bilirubin Total 0.9 <=1.2 mg/dL    GFR Estimate 73 >60 mL/min/1.73m2   Lipase     Status: Normal   Result Value Ref Range    Lipase 35 13 - 60 U/L   UA with Microscopic reflex to Culture     Status: Abnormal    Specimen: Urine,  Clean Catch   Result Value Ref Range    Color Urine Yellow Colorless, Straw, Light Yellow, Yellow    Appearance Urine Clear Clear    Glucose Urine Negative Negative mg/dL    Bilirubin Urine Negative Negative    Ketones Urine Negative Negative mg/dL    Specific Gravity Urine 1.025 1.003 - 1.035    Blood Urine Small (A) Negative    pH Urine 5.0 5.0 - 7.0    Protein Albumin Urine Negative Negative mg/dL    Urobilinogen Urine Normal Normal, 2.0 mg/dL    Nitrite Urine Negative Negative    Leukocyte Esterase Urine Negative Negative    Mucus Urine Present (A) None Seen /LPF    RBC Urine 4 (H) <=2 /HPF    WBC Urine <1 <=5 /HPF    Transitional Epithelials Urine <1 <=1 /HPF    Narrative    Urine Culture not indicated   CBC with platelets and differential     Status: Abnormal   Result Value Ref Range    WBC Count 5.9 4.0 - 11.0 10e3/uL    RBC Count 5.43 4.40 - 5.90 10e6/uL    Hemoglobin 16.7 13.3 - 17.7 g/dL    Hematocrit 49.2 40.0 - 53.0 %    MCV 91 78 - 100 fL    MCH 30.8 26.5 - 33.0 pg    MCHC 33.9 31.5 - 36.5 g/dL    RDW 12.4 10.0 - 15.0 %    Platelet Count 139 (L) 150 - 450 10e3/uL    % Neutrophils 63 %    % Lymphocytes 25 %    % Monocytes 9 %    % Eosinophils 2 %    % Basophils 1 %    % Immature Granulocytes 0 %    NRBCs per 100 WBC 0 <1 /100    Absolute Neutrophils 3.7 1.6 - 8.3 10e3/uL    Absolute Lymphocytes 1.5 0.8 - 5.3 10e3/uL    Absolute Monocytes 0.5 0.0 - 1.3 10e3/uL    Absolute Eosinophils 0.1 0.0 - 0.7 10e3/uL    Absolute Basophils 0.1 0.0 - 0.2 10e3/uL    Absolute Immature Granulocytes 0.0 <=0.4 10e3/uL    Absolute NRBCs 0.0 10e3/uL   RBC and Platelet Morphology     Status: Abnormal   Result Value Ref Range    Platelet Assessment  Automated Count Confirmed. Platelet morphology is normal.     Automated Count Confirmed. Platelet morphology is normal.    Beny Cells Slight (A) None Seen    RBC Morphology Confirmed RBC Indices    CBC with platelets differential     Status: Abnormal    Narrative    The following  orders were created for panel order CBC with platelets differential.  Procedure                               Abnormality         Status                     ---------                               -----------         ------                     CBC with platelets and d...[[id number]]  Abnormal            Final result               RBC and Platelet Morphology[[id number]]  Abnormal            Final result                 Please view results for these tests on the individual orders.     Medications   ondansetron (ZOFRAN) injection 4 mg (4 mg Intravenous $Given 9/11/23 1302)   pharmacy alert - intermittent dosing (has no administration in time range)   0.9% sodium chloride BOLUS (0 mLs Intravenous Stopped 9/11/23 1458)   ketorolac (TORADOL) injection 15 mg (15 mg Intravenous $Given 9/11/23 1302)   iopamidol (ISOVUE-370) solution 124 mL (124 mLs Intravenous $Given 9/11/23 1343)   sodium chloride (PF) 0.9% PF flush 81 mL (81 mLs Intravenous $Given 9/11/23 1343)   metroNIDAZOLE (FLAGYL) infusion 500 mg (0 mg Intravenous Stopped 9/11/23 1728)   ciprofloxacin (CIPRO) infusion 400 mg (0 mg Intravenous Stopped 9/11/23 1727)                    No results found for any visits on 09/11/23.  Medications - No data to display  Labs Ordered and Resulted from Time of ED Arrival to Time of ED Departure - No data to display  No orders to display          Critical care was not performed.     Medical Decision Making  The patient's presentation was of moderate complexity (an undiagnosed new problem with uncertain diagnosis).    The patient's evaluation involved:  review of external note(s) from 3+ sources (prior clinic notes)  review of 3+ test result(s) ordered prior to this encounter (prior labs including CD4 count)  ordering and/or review of 3+ test(s) in this encounter (labs, CT abdomen/pelvis)    The patient's management necessitated moderate risk (prescription drug management including medications given in the ED).    Assessment & Plan     Toi Gresham is a 52-year-old male with a history of HIV on antirejection medication who presents to the ER due to 1 week of ongoing epigastric pain and constipation.  Patient had some mild tenderness in the epigastric region on exam.  Patient's vital signs are stable.  Patient had lab work done including a UA but stable.  Patient has no signs of UTI.  Patient's electrolytes are stable.  Patient's creatinine is stable.  Patient's lipase is stable.  Patient's white count and hemoglobin are both stable.  We are obtaining a CT abdomen and pelvis to look for possible causes of his constipation and nausea.  Patient is receiving IV fluids and nausea medications here in the ER.    This part of the medical record was transcribed by BENITO MOROCHO, Medical Scribe, from a dictation done by Lakeshia Nj MD.       Patient CT abdomen pelvis shows concerning signs of possible a sending and transverse colon diverticulitis.  We will therefore treat with Cipro Flagyl for antibiotics.  Patient received his first dose of IV antibiotics in the ER.  A long discussion with the patient regarding diverticulitis and possible causes of it.  Plan will be to discharge him home with a 2-week course of antibiotics and outpatient follow-up.  Patient be discharged home with some simethicone and some nausea medication to take as needed.  Patient agrees to follow-up with his PCP for follow-up care.      I have reviewed the nursing notes. I have reviewed the findings, diagnosis, plan and need for follow up with the patient.    New Prescriptions    No medications on file       Final diagnoses:   Diverticulitis of colon     ILev, am serving as a trained medical scribe to document services personally performed by Lakeshia Nj MD, based on the provider's statements to me.      ILakeshia MD, was physically present and have reviewed and verified the accuracy of this note documented by Lev Hare.     Lakeshia Nj MD  M  [facility] EMERGENCY DEPARTMENT  9/11/2023     Lakeshia Nj MD  09/11/23 [id number]

## 2023-09-11 NOTE — DISCHARGE INSTRUCTIONS
Please take the antibiotics as prescribed.     Take the nausea medications as needed.     Avoid any alcohol while taking the antibiotics.     Eat a bland diet for the next several days including-bananas, rice, bread, apple sauce, avocado.      Please follow up with your primary care doctor in 1-2 weeks to make sure your symptoms are improving and to see if a GI follow up is recommended.

## 2023-09-12 ENCOUNTER — PATIENT OUTREACH (OUTPATIENT)
Dept: CARE COORDINATION | Facility: CLINIC | Age: 53
End: 2023-09-12
Payer: COMMERCIAL

## 2023-09-12 NOTE — PROGRESS NOTES
Clinic Care Coordination Contact  Follow Up Progress Note      Assessment: The pt was recently in the ED, I called to check up on the pt, and help the pt setup a ED follow up. The pt was at  [facility] for nausea and constipation. I called the pt,but got his vm, so I left a vm for the pt to give me a call back.     Care Gaps:    Health Maintenance Due   Topic Date Due    YEARLY PREVENTIVE VISIT  Never done    LIPID  Never done    MICROALBUMIN  Never done    DIABETIC FOOT EXAM  Never done    ADVANCE CARE PLANNING  Never done    EYE EXAM  Never done    HEPATITIS B IMMUNIZATION (1 of 3 - 3-dose series) Never done    COLORECTAL CANCER SCREENING  Never done    HEPATITIS C SCREENING  Never done    HEPATITIS A IMMUNIZATION (1 of 2 - Risk 2-dose series) Never done    A1C  05/07/2023    INFLUENZA VACCINE (1) 09/01/2023           Care Plans      Intervention/Education provided during outreach:               Plan:     Care Coordinator will follow up in

## 2023-09-13 ENCOUNTER — PATIENT OUTREACH (OUTPATIENT)
Dept: CARE COORDINATION | Facility: CLINIC | Age: 53
End: 2023-09-13
Payer: COMMERCIAL

## 2023-09-13 NOTE — PROGRESS NOTES
Clinic Care Coordination Contact  Follow Up Progress Note      Assessment: The pt was recently in the ED, I called to check up on the pt, and help the pt setup a ED follow up. The pt was at  [facility] for nausea and constipation. I called and talked to the pt, pt stated that he is doing better. Pt stated that he has a follow up with  on 10/02/2023 at 2:00pm and will just wait till then.    Care Gaps:    Health Maintenance Due   Topic Date Due    YEARLY PREVENTIVE VISIT  Never done    LIPID  Never done    MICROALBUMIN  Never done    DIABETIC FOOT EXAM  Never done    ADVANCE CARE PLANNING  Never done    EYE EXAM  Never done    HEPATITIS B IMMUNIZATION (1 of 3 - 3-dose series) Never done    COLORECTAL CANCER SCREENING  Never done    HEPATITIS C SCREENING  Never done    HEPATITIS A IMMUNIZATION (1 of 2 - Risk 2-dose series) Never done    A1C  05/07/2023    INFLUENZA VACCINE (1) 09/01/2023           Care Plans      Intervention/Education provided during outreach:               Plan:     Care Coordinator will follow up in

## 2023-09-15 ENCOUNTER — HOSPITAL ENCOUNTER (EMERGENCY)
Facility: CLINIC | Age: 53
Discharge: HOME | End: 2023-09-16
Payer: COMMERCIAL

## 2023-09-15 ASSESSMENT — ACTIVITIES OF DAILY LIVING (ADL)
ADLS_ACUITY_SCORE: 35
ADLS_ACUITY_SCORE: 35

## 2023-09-16 ASSESSMENT — ACTIVITIES OF DAILY LIVING (ADL)
ADLS_ACUITY_SCORE: 35

## 2023-09-19 ENCOUNTER — PATIENT OUTREACH (OUTPATIENT)
Dept: CARE COORDINATION | Facility: CLINIC | Age: 53
End: 2023-09-19
Payer: COMMERCIAL

## 2023-09-19 DIAGNOSIS — A09 DIARRHEA OF INFECTIOUS ORIGIN: Primary | ICD-10-CM

## 2023-09-19 NOTE — PROGRESS NOTES
Clinic Care Coordination Contact  Follow Up Progress Note      Assessment: : he pt was recently in the ED, I called to check up on the pt and help the pt setup a ED follow up. I called and talked to the pt, pt stated that he is trying to get in an appointment with gastro, but its way out, till November or December. Pt does have a follow up on 10/02/2023 at 2:00pm with , and will wait.     Care Gaps:    Health Maintenance Due   Topic Date Due    YEARLY PREVENTIVE VISIT  Never done    LIPID  Never done    MICROALBUMIN  Never done    DIABETIC FOOT EXAM  Never done    ADVANCE CARE PLANNING  Never done    EYE EXAM  Never done    HEPATITIS B IMMUNIZATION (1 of 3 - 3-dose series) Never done    COLORECTAL CANCER SCREENING  Never done    HEPATITIS C SCREENING  Never done    HEPATITIS A IMMUNIZATION (1 of 2 - Risk 2-dose series) Never done    A1C  05/07/2023    INFLUENZA VACCINE (1) 09/01/2023           Care Plans      Intervention/Education provided during outreach:               Plan:     Care Coordinator will follow up in

## 2023-09-21 ENCOUNTER — VIRTUAL VISIT (OUTPATIENT)
Dept: INFECTIOUS DISEASES | Facility: CLINIC | Age: 53
End: 2023-09-21
Attending: INTERNAL MEDICINE
Payer: COMMERCIAL

## 2023-09-21 DIAGNOSIS — B20 HUMAN IMMUNODEFICIENCY VIRUS (HIV) DISEASE (H): Primary | ICD-10-CM

## 2023-09-21 DIAGNOSIS — B20 HUMAN IMMUNODEFICIENCY VIRUS (HIV) DISEASE (H): ICD-10-CM

## 2023-09-21 DIAGNOSIS — R10.84 GENERALIZED ABDOMINAL PAIN: ICD-10-CM

## 2023-09-21 PROCEDURE — 99214 OFFICE O/P EST MOD 30 MIN: CPT | Mod: VID | Performed by: INTERNAL MEDICINE

## 2023-09-21 RX ORDER — LISINOPRIL 5 MG/1
5 TABLET ORAL DAILY
Qty: 90 TABLET | Refills: 1 | Status: SHIPPED | OUTPATIENT
Start: 2023-09-21 | End: 2023-10-02

## 2023-09-21 ASSESSMENT — PATIENT HEALTH QUESTIONNAIRE - PHQ9: SUM OF ALL RESPONSES TO PHQ QUESTIONS 1-9: 9

## 2023-09-21 NOTE — PROGRESS NOTES
Virtual Visit Details    Type of service:  Video Visit     Originating Location (pt. Location): Home  Distant Location (provider location):  On-site  Platform used for Video Visit: AmWell  Time 8:42-9:00      [facility]  Clinic Follow-Up Visit  9/21/23       History of Present Illness:     Toi Gresham is a 52 year old y.o with a PMH of HTN, HIV who presents for follow-up. He was diagnosed with HIV in 1990 and did not start ART until about 2009 when Atripla came out. Has been on various ART regimens including Atripla, Raltegravir + Truvvada, Doluitegravir + Descovy with good control. Now doing well on Biktarvy. Reports no missed doses. Sexually active, monogamous with partner, no acute concerns for STI.     Does have headache/neck/arm pain which has previously been worked up and been attributed to cervical radiculopathy. He is interested in further evaluation of this and better symptomatic relief. He previously had a neurology appointment but cancelled it - interested in re-referral. Also interested in whether acupuncture might help so referral placed for this as well.     Ongoing recent abdominal pain - CT in ED with possible diverticulitis but didn't improve with cipro/flagyl and didn't tolerate the medications well so discontinued them. Repeat CT without diverticulitis noted. Referral made to GI with [facility] ([place] wait was very long) and pending.     Also reports history of ASCUS with prior anal pap. Hasn't had follow-up in recent years.         HIV History:   Date of Diagnosis: August 1990, started ART 2009  Approximated time of transmission: Negative 2 months prior to diagnosis   CD4 [place]: 289  Viral Load at Diagnosis: not on file   Opportunistic Infections: no h/o OI  CMV Status: not on file   Toxo Status: not on file   HLA  Status: not on file   Tuberculosis Screening: 10/28/2022   Historical use of ARVs: Atripla, Raltegravir + Truvada, Dolutegravir + Descovy   Historical Resistance  Mutations: not on file        Key Prior Lab/Imaging and other data   10/28/22   Quant gold negative   VZV IgG reactive   Rubella IgG reactive   Mumps IgG reactive   Rubeola IgG reactive   HBV surface Ab reactive     9/20/22   HIV RNA quant not detected   Cr 1.22        Other Medical History:     Allergies Patient has no known allergies.    Social History  He reports that he has quit smoking. His smoking use included cigarettes. He smoked an average of 10 packs per day. He has never used smokeless tobacco. He reports current alcohol use. He reports that he does not use drugs.   , partner works in [employer] business which is why they relocated to [place]. 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      Vaccination History:  Immunization History   Administered Date(s) Administered    COVID-19 Bivalent 12+ (Pfizer) 09/24/2022    COVID-19 MONOVALENT 12+ (Pfizer) 12/18/2020, 01/08/2021, 08/20/2021    Flu, Unspecified 01/01/2009, 09/01/2010, 11/22/2011, 08/27/2012, 11/04/2013, 10/01/2018    Y9w3-60 Novel Flu 11/18/2009    Influenza (H1N1) 11/01/2009    Influenza (IIV3) PF 10/16/2008, 10/06/2009, 09/23/2013    Influenza Vaccine 18-64 (Flublok) 10/05/2019, 09/02/2020, 10/02/2023    Influenza Vaccine >6 months (Alfuria,Fluzone) 10/05/2019, 09/01/2020, 09/02/2020, 09/24/2022    Influenza Vaccine, 6+MO IM (QUADRIVALENT W/PRESERVATIVES) 09/12/2017    MENINGOCOCCAL ACWY (MENQUADFI ) 08/03/2023    MMR 06/16/2022    Meningococcal ACWY (Menactra ) 01/26/2016, 04/28/2016    Pneumococcal 20 valent Conjugate (Prevnar 20) 08/03/2023    Pneumococcal 23 valent 11/23/2010, 04/24/2018    TDAP (Adacel,Boostrix) 04/28/2016    Zoster recombinant adjuvanted (SHINGRIX) 02/01/2021, 08/29/2022             Physical Exam:     VITAL SIGNS:  There were no vitals taken for this visit.   Gen: Alert and in no distress.   Psych: Normal affect. Alert and oriented.   HEENT: PERRL. No icterus. Oropharynx pink and moist  without lesions.   Neck: Supple  Chest: Normal respiratory effort.   Extremities: Warm and well perfused.   Skin: No rashes or lesions noted.           Assessment and Plan   Encouraged to establish primary care for co-management.     Human immunodeficiency virus   Excellent adherence, undetectable 9/2022. Previously followed in [place] at [facility] and [facility].   -Continue Biktarvy unchanged.   - CBC, CMP  - HIV VL, T cell subset     Abdominal pain  -Given recent diarrhea will check enteric stool panel but symptoms not overall consistent with infection  -Strongly encouraged keeping plan for GI follow-up    Cervical and lumbar radiculoapthy (?)  -Had previously been seen by neurology, referral placed for him to follow-up with them here  -Acupuncture referral placed     Hyperglycemia:   Reports history of borderline A1cs. Last A1c done here had an erroneous report and repeat wasn't drawn. Will repeat with next labs.     HTN   Refill Lisinopril 5mg    Microscopic hematuria   Reports this has been present for many years. Previously had it worked up with a renal ultrasound which was unremarkable. sCr generally ~1.2.   - Repeat UA, will monitor for proteinuria     GERD  Reflux esophagitis on EGD 2021, on omeprazole. Recurs upon stopping omeprazole.   - Continue omeprazole for now   - Recommend eventual repeat EGD    Preventative Medicine  Cardiovascular [place]:                        Lipids: 9/2021 , HDL 57, TG 54, LDL 61                       Blood Pressure: On 5mg Lisinopril   Cancer Screening:                       Colon: 8/13/2021 negative                        Anal: Reports history of ASCUS - not yet established with CRS here. Referral placed.   Immunizations:                       Hepatitis A: not on file, 12/2017 reactive IgG                       Hepatitis B:  not on file, 10/28/22 immune by serology                        Influenza: Recommended Seasonal Vaccine                        Pneumovax/Prevnar PPSV23 4/24/2018. Prevnar 20 given 8/3/23.                        Tdap: 4/28/2016                       Meningococcus: 1/26/2016, 4/28/2016. Booster given 8/3/23.  Bone Health: Patient reports possible osteopenia on previous DEXA. Will bring results to next appt  Renal Health: Cr: 1.22, UA done 1/2021 with small blood, reports h/o unremarkable renal US   Sexually Transmitted Infection Risk and Screening:                       Gonorrhea and Chlamydia: not detected 9/2021                        Syphilis: RPR negative 9/2021     Patient reports no need for re-screening today.               RTC 6 months     Alison Jean MD  Infectious Diseases  Pager [phone]

## 2023-09-21 NOTE — NURSING NOTE
Is the patient currently in the state of [place]? YES    Visit mode:VIDEO    If the visit is dropped, the patient can be reconnected by: VIDEO VISIT: Send to e-mail at: [email or web address]    Will anyone else be joining the visit? NO  (If patient encounters technical issues they should call [phone]    How would you like to obtain your AVS? MyChart    Are changes needed to the allergy or medication list? Pt stated no changes to allergies and Pt stated no med changes    Reason for visit: JONE STEVENS

## 2023-09-21 NOTE — LETTER
9/21/2023       RE: Toi Gresham  [address] [facility] [address]     Dear Colleague,    Thank you for referring your patient, Toi Gresham, to the [facility] INFECTIOUS DISEASE CLINIC [place] [facility]. Please see a copy of my visit note below.    Virtual Visit Details    Type of service:  Video Visit     Originating Location (pt. Location): Home  Distant Location (provider location):  On-site  Platform used for Video Visit: AmWell  Time 8:42-9:00      [facility]  Clinic Follow-Up Visit  9/21/23       History of Present Illness:     Toi Gresham is a 52 year old y.o with a PMH of HTN, HIV who presents for follow-up. He was diagnosed with HIV in 1990 and did not start ART until about 2009 when Atripla came out. Has been on various ART regimens including Atripla, Raltegravir + Truvvada, Doluitegravir + Descovy with good control. Now doing well on Biktarvy. Reports no missed doses. Sexually active, monogamous with partner, no acute concerns for STI.     Does have headache/neck/arm pain which has previously been worked up and been attributed to cervical radiculopathy. He is interested in further evaluation of this and better symptomatic relief. He previously had a neurology appointment but cancelled it - interested in re-referral. Also interested in whether acupuncture might help so referral placed for this as well.     Ongoing recent abdominal pain - CT in ED with possible diverticulitis but didn't improve with cipro/flagyl and didn't tolerate the medications well so discontinued them. Repeat CT without diverticulitis noted. Referral made to GI with [facility] ([place] wait was very long) and pending.     Also reports history of ASCUS with prior anal pap. Hasn't had follow-up in recent years.         HIV History:   Date of Diagnosis: August 1990, started ART 2009  Approximated time of transmission: Negative 2 months prior to diagnosis   CD4  [place]: 289  Viral Load at Diagnosis: not on file   Opportunistic Infections: no h/o OI  CMV Status: not on file   Toxo Status: not on file   HLA  Status: not on file   Tuberculosis Screening: 10/28/2022   Historical use of ARVs: Atripla, Raltegravir + Truvada, Dolutegravir + Descovy   Historical Resistance Mutations: not on file        Key Prior Lab/Imaging and other data   10/28/22   Quant gold negative   VZV IgG reactive   Rubella IgG reactive   Mumps IgG reactive   Rubeola IgG reactive   HBV surface Ab reactive     9/20/22   HIV RNA quant not detected   Cr 1.22        Other Medical History:     Allergies Patient has no known allergies.    Social History  He reports that he has quit smoking. His smoking use included cigarettes. He smoked an average of 10 packs per day. He has never used smokeless tobacco. He reports current alcohol use. He reports that he does not use drugs.   , partner works in [employer] business which is why they relocated to [place]. 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      Vaccination History:  Immunization History   Administered Date(s) Administered    COVID-19 Bivalent 12+ (Pfizer) 09/24/2022    COVID-19 MONOVALENT 12+ (Pfizer) 12/18/2020, 01/08/2021, 08/20/2021    Flu, Unspecified 01/01/2009, 09/01/2010, 11/22/2011, 08/27/2012, 11/04/2013, 10/01/2018    X7h2-54 Novel Flu 11/18/2009    Influenza (H1N1) 11/01/2009    Influenza (IIV3) PF 10/16/2008, 10/06/2009, 09/23/2013    Influenza Vaccine 18-64 (Flublok) 10/05/2019, 09/02/2020, 10/02/2023    Influenza Vaccine >6 months (Alfuria,Fluzone) 10/05/2019, 09/01/2020, 09/02/2020, 09/24/2022    Influenza Vaccine, 6+MO IM (QUADRIVALENT W/PRESERVATIVES) 09/12/2017    MENINGOCOCCAL ACWY (MENQUADFI ) 08/03/2023    MMR 06/16/2022    Meningococcal ACWY (Menactra ) 01/26/2016, 04/28/2016    Pneumococcal 20 valent Conjugate (Prevnar 20) 08/03/2023    Pneumococcal 23 valent 11/23/2010, 04/24/2018    TDAP  (Adacel,Boostrix) 04/28/2016    Zoster recombinant adjuvanted (SHINGRIX) 02/01/2021, 08/29/2022             Physical Exam:     VITAL SIGNS:  There were no vitals taken for this visit.   Gen: Alert and in no distress.   Psych: Normal affect. Alert and oriented.   HEENT: PERRL. No icterus. Oropharynx pink and moist without lesions.   Neck: Supple  Chest: Normal respiratory effort.   Extremities: Warm and well perfused.   Skin: No rashes or lesions noted.           Assessment and Plan   Encouraged to establish primary care for co-management.     Human immunodeficiency virus   Excellent adherence, undetectable 9/2022. Previously followed in [place] at [facility] and [facility].   -Continue Biktarvy unchanged.   - CBC, CMP  - HIV VL, T cell subset     Abdominal pain  -Given recent diarrhea will check enteric stool panel but symptoms not overall consistent with infection  -Strongly encouraged keeping plan for GI follow-up    Cervical and lumbar radiculoapthy (?)  -Had previously been seen by neurology, referral placed for him to follow-up with them here  -Acupuncture referral placed     Hyperglycemia:   Reports history of borderline A1cs. Last A1c done here had an erroneous report and repeat wasn't drawn. Will repeat with next labs.     HTN   Refill Lisinopril 5mg    Microscopic hematuria   Reports this has been present for many years. Previously had it worked up with a renal ultrasound which was unremarkable. sCr generally ~1.2.   - Repeat UA, will monitor for proteinuria     GERD  Reflux esophagitis on EGD 2021, on omeprazole. Recurs upon stopping omeprazole.   - Continue omeprazole for now   - Recommend eventual repeat EGD    Preventative Medicine  Cardiovascular [place]:                        Lipids: 9/2021 , HDL 57, TG 54, LDL 61                       Blood Pressure: On 5mg Lisinopril   Cancer Screening:                       Colon: 8/13/2021 negative                        Anal: Reports history of ASCUS  - not yet established with CRS here. Referral placed.   Immunizations:                       Hepatitis A: not on file, 12/2017 reactive IgG                       Hepatitis B:  not on file, 10/28/22 immune by serology                        Influenza: Recommended Seasonal Vaccine                       Pneumovax/Prevnar PPSV23 4/24/2018. Prevnar 20 given 8/3/23.                        Tdap: 4/28/2016                       Meningococcus: 1/26/2016, 4/28/2016. Booster given 8/3/23.  Bone Health: Patient reports possible osteopenia on previous DEXA. Will bring results to next appt  Renal Health: Cr: 1.22, UA done 1/2021 with small blood, reports h/o unremarkable renal US   Sexually Transmitted Infection Risk and Screening:                       Gonorrhea and Chlamydia: not detected 9/2021                        Syphilis: RPR negative 9/2021     Patient reports no need for re-screening today.               RTC 6 months     Alison Jean MD  Infectious Diseases  Pager [phone]

## 2023-09-22 DIAGNOSIS — B20 HUMAN IMMUNODEFICIENCY VIRUS (HIV) DISEASE (H): ICD-10-CM

## 2023-09-22 NOTE — TELEPHONE ENCOUNTER
Per [facility] Ambulatory Care Protocol, Pt is due for refill based on last refill date.   Pt has seen provider within the past year, or has appt scheduled with provider.     Med refill script E-sent to pt's pharmacy.    Signed Prescriptions:                        Disp   Refills    bictegravir-emtricitabine-tenofovir (BIKTA*90 tab*3        Sig: Take 1 tablet by mouth daily  Authorizing Provider: BRAULIO LONDONO User: ASHISH ALEX RN  Infectious Disease 09/22/23 3:10 PM

## 2023-09-25 ENCOUNTER — TELEPHONE (OUTPATIENT)
Dept: PODIATRY | Facility: CLINIC | Age: 53
End: 2023-09-25

## 2023-09-25 NOTE — TELEPHONE ENCOUNTER
[facility] Call Center    Phone Message    May a detailed message be left on voicemail: no     Reason for Call: Requesting c/b- has an appt at 830 am today and would like to know if he can make it a virtual visit

## 2023-09-25 NOTE — TELEPHONE ENCOUNTER
Called patient at [phone] and informed him that Dr. Jennings cannot do a virtual visit. Rescheduled patient with Dr. Hunter on 09/26 at [facility].     Tony Mcgrath, Visit Facilitator

## 2023-09-26 ENCOUNTER — OFFICE VISIT (OUTPATIENT)
Dept: PODIATRY | Facility: CLINIC | Age: 53
End: 2023-09-26
Payer: COMMERCIAL

## 2023-09-26 VITALS
HEIGHT: 69 IN | DIASTOLIC BLOOD PRESSURE: 78 MMHG | BODY MASS INDEX: 29.62 KG/M2 | WEIGHT: 200 LBS | SYSTOLIC BLOOD PRESSURE: 112 MMHG

## 2023-09-26 DIAGNOSIS — M77.52 CAPSULITIS OF METATARSOPHALANGEAL (MTP) JOINT OF LEFT FOOT: Primary | ICD-10-CM

## 2023-09-26 DIAGNOSIS — M77.42 METATARSALGIA, LEFT FOOT: ICD-10-CM

## 2023-09-26 PROCEDURE — 99203 OFFICE O/P NEW LOW 30 MIN: CPT | Performed by: PODIATRIST

## 2023-09-26 ASSESSMENT — PAIN SCALES - GENERAL: PAINLEVEL: MODERATE PAIN (5)

## 2023-09-26 NOTE — PATIENT INSTRUCTIONS
Thank you for choosing [facility] Podiatry / Foot & Ankle Surgery!    DR. GROVE'S CLINIC LOCATIONS:     [facility] TRIAGE LINE: [phone]   [address] [place] [address] APPOINTMENTS: [phone]   [place] [address] RADIOLOGY: [phone]   (Every other Tues - Wed - Fri PM) SET UP SURGERY: [phone]    PHYSICAL THERAPY: [phone]   [facility] BILLING QUESTIONS: [phone] [address] FAX: [phone]   [place] [address]    (Thurs & Fri AM)       CAPSULITIS / METATARSALGIA  All joints in the body are surrounded by a capsule, or a covering of soft tissue and ligaments. The capsule holds bones together and secretes joint fluid to help lubricate the joint. If a joint capsule is exposed to excessive force, it can develop microscopic tears and become inflamed. This commonly occurs in the foot due to mild variation in anatomy. Hammertoes, bunions, irregular bone length, joint immobility, etc. can all lead to excessive force on the joint. Capsule injury can also occur due to repetitive stress from exercise, insufficient support from shoes, excessive bare foot walking and excessive weight.      Conservative treatments include ice, rest from the aggravating activity, weight loss, orthotic inserts, improving shoes and shoe modifications. You can purchase an over the counter felt metatarsal pad to place in your shoes at [facility] or on [facility]. Appropriate shoes will protect the inflamed tissue improving the chances of healing. Avoidance of standing or walking barefoot, including around the house, is necessary to allow healing. Casts are sometimes used for more aggressive protection.  NSAIDs such as Advil are also used to help with pain and decreasing inflammation. If pain continues over a period of weeks with continuous rest and icing, Corticosteroid injections can be a treatment option to try and help decrease inflammation.    Surgery is often necessary to correct the underlying  "structural problem. Surgery might include shortening an excessively long bone, repairing bunion or hammertoe, lengthening a tight Achilles  tendon, etc. These are same day surgeries that might be pursued if more conservative measures fail to provide relief.      The inflamed joint capsule has the potential to completely tear. This will allow the toe to drift off the ground, curving toward the other toes. The involved toe may under or overlap the adjacent toes as drift continues. The pain may improve after the joint tears or this new position will be permanent. Surgery can address the toe alignment. Your goal of treating capsulitis is to avoid this scenario.        ** You can find felt metatarsal pads to place in your shoes at our [facility], [employer], [employer], or on [employer]     Dr. Hunter likes the Hapad brand.    FOREFOOT PAIN RECOMMENDATIONS    1) Please consider stiffer/ rigid - soled shoes as much as possible. These take stress off of the ball of your foot. This might be short term, until pain resolves, or long term to keep pain controlled.    2) Avoid barefoot walking, walking in socks, flexible shoes, flip flops, shoes without arch support, etc.    3) It is a good idea to wear a shoe at all times, including when at home.    4) Consider purchasing a felt metatarsal pad.  A good brand is \"Hapad.\"  You can find these and others online.  Also, these can be built into custom/prescription arch supports.    5) Consider a quality over-the-counter arch support. These can be found at [employer].  If Dr. Hunter prescribed custom orthoses, please consider this option.    6) Ice and anti inflammatories can be helpful, earlier on in the process.  Anti inflammatories are not good for you, if taken for a long period of time.     7) Gentle calf stretching can take pressure off of the ball of your foot.    Calf/Achilles Stretching: Lay on you back and raise one foot, then point your toes towards the " floor. See photo below:               Hold each stretch for 30 seconds. Stretch 10 times per set, three sets per day. Morning, afternoon and evening. If your heel pain is very severe in the morning, consider doing the first set of stretches before you get out of bed.       [facility]  [address] [address]  [place] [address]  Phone: [phone]  Fax: [phone] [facility]   [address]  [place] [address]  Phone: [phone]  Fax: [phone]   [facility] [place]  [address]  [place] [address]  Phone: [phone]  Fax: [phone] [facility]  [address]  [place] [address]  Phone: [phone]   Fax: [phone]   * Please call any location listed to make an appointment for a casting/fitting. Your referral was sent to their central office and they will all have the order on file.

## 2023-09-26 NOTE — LETTER
"    9/26/2023         RE: Toi Gresham  [address] [address]        Dear Colleague,    Thank you for referring your patient, Toi Gresham, to the [facility]. Please see a copy of my visit note below.    ASSESSMENT:  Encounter Diagnoses   Name Primary?     Capsulitis of metatarsophalangeal (MTP) joint of left foot Yes     Metatarsalgia, left foot      MEDICAL DECISION MAKING:  His pain is most consistent with capsulitis of the left second metatarsal phalangeal joint.  I explained how this is likely related to the length of the second metatarsal and overuse.  Less likely gout, given clinical exam and location.    In addition to the recommendations listed below, he is referred for custom molded orthoses with metatarsal padding.    FOREFOOT PAIN RECOMMENDATIONS    1) Please consider stiffer/ rigid - soled shoes as much as possible. These take stress off of the ball of your foot.    2) Avoid barefoot walking, walking in socks, flexible shoes and flip flops.    3) It is a good idea to wear a shoe at all times, including when at home.    4) Consider purchasing a felt metatarsal pad.  A good brand is \"Hapad.\"  You can find these and others online.  Also, these can be built into custom/prescription arch supports.    5) Consider a quality over-the-counter arch support. These can be found at [facility] and [facility].  If Dr. Hunter prescribed custom orthoses, please consider this option.    6) Ice and anti inflammatories can be helpful, earlier on in the process.  Anti inflammatories are not good for you, if take for a long period of time.       Disclaimer: This note consists of symbols derived from keyboarding, dictation and/or voice recognition software. As a result, there may be errors in the script that have gone undetected. Please consider this when interpreting information found in this chart.    Tuan Hunter DPM, FACFAS, [facility] Department of Podiatry/Foot & Ankle " "Surgery      ____________________________________________________________________    HPI:       Toi presents today reporting 6 days of left foot pain.  He woke up with the pain.  Was difficult to walk.  Burning pain  Describes the area below the left second metatarsal phalangeal joint.  Pain is rated as high as an 8 out of 10 at its worst.  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      *No past medical history on file.*  *No past surgical history on file.*  *  Current Outpatient Medications   Medication Sig Dispense Refill     bictegravir-emtricitabine-tenofovir (BIKTARVY) -25 MG per tablet Take 1 tablet by mouth daily 90 tablet 3     lisinopril (ZESTRIL) 5 MG tablet TAKE ONE TABLET BY MOUTH ONCE DAILY 90 tablet 1     omeprazole (PRILOSEC) 20 MG DR capsule Take 20 mg by mouth daily       omeprazole (PRILOSEC) 40 MG DR capsule Take 1 capsule (40 mg) by mouth daily for 30 days 30 capsule 0     polyethylene glycol (MIRALAX) 17 GM/Dose powder Take 17 g (1 Capful) by mouth daily 510 g 0     sucralfate (CARAFATE) 1 GM tablet Take 1 tablet (1 g) by mouth 4 times daily for 30 days 120 tablet 0         EXAM:    Vitals: /78   Ht 1.753 m (5' 9\")   Wt 90.7 kg (200 lb)   BMI 29.53 kg/m    BMI: Body mass index is 29.53 kg/m .    Constitutional:  Toi Gresham is in no apparent distress, appears well-nourished.  Cooperative with history and physical exam.    Vascular:  Pedal pulses are palpable for both the DP and PT arteries.  CFT < 3 sec.  No edema.      Neuro: Light touch sensation is intact to the L4, L5, S1 distributions  No evidence of weakness, spasticity, or contracture in the lower extremities.     Derm: Normal texture and turgor.  No erythema, ecchymosis, or cyanosis.  No open lesions.     Musculoskeletal:    Lower extremity muscle strength is normal. No gross deformities.  There are some pain on palpation to the plantar aspect of the left second metatarsal phalangeal joint.  With passive " plantarflexion of the toes, the second metatarsal is noted to extend more distally than the first.  No swelling or erythema today.      Again, thank you for allowing me to participate in the care of your patient.        Sincerely,        Tuan Hunter DPM

## 2023-09-26 NOTE — PROGRESS NOTES
"ASSESSMENT:  Encounter Diagnoses   Name Primary?    Capsulitis of metatarsophalangeal (MTP) joint of left foot Yes    Metatarsalgia, left foot      MEDICAL DECISION MAKING:  His pain is most consistent with capsulitis of the left second metatarsal phalangeal joint.  I explained how this is likely related to the length of the second metatarsal and overuse.  Less likely gout, given clinical exam and location.    In addition to the recommendations listed below, he is referred for custom molded orthoses with metatarsal padding.    FOREFOOT PAIN RECOMMENDATIONS    1) Please consider stiffer/ rigid - soled shoes as much as possible. These take stress off of the ball of your foot.    2) Avoid barefoot walking, walking in socks, flexible shoes and flip flops.    3) It is a good idea to wear a shoe at all times, including when at home.    4) Consider purchasing a felt metatarsal pad.  A good brand is \"Hapad.\"  You can find these and others online.  Also, these can be built into custom/prescription arch supports.    5) Consider a quality over-the-counter arch support. These can be found at [facility].  If Dr. Hunter prescribed custom orthoses, please consider this option.    6) Ice and anti inflammatories can be helpful, earlier on in the process.  Anti inflammatories are not good for you, if take for a long period of time.       Disclaimer: This note consists of symbols derived from keyboarding, dictation and/or voice recognition software. As a result, there may be errors in the script that have gone undetected. Please consider this when interpreting information found in this chart.    Tuan Hunter, RALF, FACFAS, [facility] Department of Podiatry/Foot & Ankle Surgery      ____________________________________________________________________    HPI:       Toi presents today reporting 6 days of left foot pain.  He woke up with the pain.  Was difficult to walk.  Burning pain  Describes the area below the left second " "metatarsal phalangeal joint.  Pain is rated as high as an 8 out of 10 at its worst.  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      *No past medical history on file.*  *No past surgical history on file.*  *  Current Outpatient Medications   Medication Sig Dispense Refill    bictegravir-emtricitabine-tenofovir (BIKTARVY) -25 MG per tablet Take 1 tablet by mouth daily 90 tablet 3    lisinopril (ZESTRIL) 5 MG tablet TAKE ONE TABLET BY MOUTH ONCE DAILY 90 tablet 1    omeprazole (PRILOSEC) 20 MG DR capsule Take 20 mg by mouth daily      omeprazole (PRILOSEC) 40 MG DR capsule Take 1 capsule (40 mg) by mouth daily for 30 days 30 capsule 0    polyethylene glycol (MIRALAX) 17 GM/Dose powder Take 17 g (1 Capful) by mouth daily 510 g 0    sucralfate (CARAFATE) 1 GM tablet Take 1 tablet (1 g) by mouth 4 times daily for 30 days 120 tablet 0         EXAM:    Vitals: /78   Ht 1.753 m (5' 9\")   Wt 90.7 kg (200 lb)   BMI 29.53 kg/m    BMI: Body mass index is 29.53 kg/m .    Constitutional:  Toi Gresham is in no apparent distress, appears well-nourished.  Cooperative with history and physical exam.    Vascular:  Pedal pulses are palpable for both the DP and PT arteries.  CFT < 3 sec.  No edema.      Neuro: Light touch sensation is intact to the L4, L5, S1 distributions  No evidence of weakness, spasticity, or contracture in the lower extremities.     Derm: Normal texture and turgor.  No erythema, ecchymosis, or cyanosis.  No open lesions.     Musculoskeletal:    Lower extremity muscle strength is normal. No gross deformities.  There are some pain on palpation to the plantar aspect of the left second metatarsal phalangeal joint.  With passive plantarflexion of the toes, the second metatarsal is noted to extend more distally than the first.  No swelling or erythema today.    "

## 2023-10-02 ENCOUNTER — OFFICE VISIT (OUTPATIENT)
Dept: FAMILY MEDICINE | Facility: CLINIC | Age: 53
End: 2023-10-02
Payer: COMMERCIAL

## 2023-10-02 VITALS
BODY MASS INDEX: 29.4 KG/M2 | OXYGEN SATURATION: 99 % | WEIGHT: 194 LBS | HEART RATE: 71 BPM | TEMPERATURE: 98.1 F | DIASTOLIC BLOOD PRESSURE: 66 MMHG | HEIGHT: 68 IN | SYSTOLIC BLOOD PRESSURE: 105 MMHG

## 2023-10-02 DIAGNOSIS — F41.1 GENERALIZED ANXIETY DISORDER: ICD-10-CM

## 2023-10-02 DIAGNOSIS — Z76.89 ENCOUNTER TO ESTABLISH CARE: Primary | ICD-10-CM

## 2023-10-02 DIAGNOSIS — R10.13 EPIGASTRIC PAIN: ICD-10-CM

## 2023-10-02 DIAGNOSIS — K59.00 CONSTIPATION, UNSPECIFIED CONSTIPATION TYPE: ICD-10-CM

## 2023-10-02 DIAGNOSIS — K21.00 GASTROESOPHAGEAL REFLUX DISEASE WITH ESOPHAGITIS WITHOUT HEMORRHAGE: ICD-10-CM

## 2023-10-02 DIAGNOSIS — Z13.220 SCREENING FOR HYPERLIPIDEMIA: ICD-10-CM

## 2023-10-02 DIAGNOSIS — I10 ESSENTIAL HYPERTENSION, BENIGN: ICD-10-CM

## 2023-10-02 DIAGNOSIS — Z13.1 SCREENING FOR DIABETES MELLITUS: ICD-10-CM

## 2023-10-02 DIAGNOSIS — Z23 NEED FOR PROPHYLACTIC VACCINATION AND INOCULATION AGAINST INFLUENZA: ICD-10-CM

## 2023-10-02 LAB — HBA1C MFR BLD: 5.5 % (ref 0–5.6)

## 2023-10-02 PROCEDURE — 90682 RIV4 VACC RECOMBINANT DNA IM: CPT

## 2023-10-02 PROCEDURE — 99214 OFFICE O/P EST MOD 30 MIN: CPT | Mod: 25

## 2023-10-02 PROCEDURE — 36415 COLL VENOUS BLD VENIPUNCTURE: CPT

## 2023-10-02 PROCEDURE — 83036 HEMOGLOBIN GLYCOSYLATED A1C: CPT

## 2023-10-02 PROCEDURE — 90471 IMMUNIZATION ADMIN: CPT

## 2023-10-02 PROCEDURE — 80061 LIPID PANEL: CPT

## 2023-10-02 RX ORDER — LISINOPRIL 5 MG/1
5 TABLET ORAL DAILY
Qty: 90 TABLET | Refills: 4 | Status: SHIPPED | OUTPATIENT
Start: 2023-10-02 | End: 2023-10-02

## 2023-10-02 RX ORDER — SENNOSIDES 8.6 MG
1 TABLET ORAL 2 TIMES DAILY PRN
Qty: 60 TABLET | Refills: 1 | Status: SHIPPED | OUTPATIENT
Start: 2023-10-02

## 2023-10-02 RX ORDER — OMEPRAZOLE 40 MG/1
40 CAPSULE, DELAYED RELEASE ORAL DAILY
Qty: 30 CAPSULE | Refills: 0 | Status: CANCELLED | OUTPATIENT
Start: 2023-10-02

## 2023-10-02 NOTE — PROGRESS NOTES
"  Assessment & Plan     Encounter to establish care  Recently moved from [place], needing new PCP. Has PMH of HIV, HTN, GERD on chronic PPI. Recent urgent care and ED visits for persistent epigastric pain with nausea, see below. Follows with new ID doc for HIV, on Biktarvy. Currently sexually active with one long-time partner. Has 10y smoking history but hasn't smoked in almost three decades. Several drinks weekly, otherwise no other substance use. Mental health \"on and off,\" see below.     Epigastric pain  Gastroesophageal reflux disease with esophagitis without hemorrhage  Chronic use of PPI, reports undergoing colonoscopy/EGD in 2021 which showed reflux gastritis. Was recently seen at urgent care and ED for new, persistent epigastric pain with nausea. Initial CT in ED concerning for diverticulitis so started on antibiotics but repeat was negative. Continues to be symptomatic despite increased PPI, carafate and trying to avoid dietary triggers. Has GI appt scheduled for 10/13, suspect he will need repeat EGD. Wants to continue lower dose PPI for now d/t adverse effects on higher dose.   - omeprazole (PRILOSEC) 20 MG DR capsule; Take 1 capsule (20 mg) by mouth daily    Generalized anxiety disorder  Mental health has been \"on and off.\" Affirms previous history of medication management and therapy for anxiety. Hoping to establish with new therapist. Thinks he has PTSD from childhood as well as living through the '90s with HIV. Wanting therapist with LGBTQ experience. Encouraged him to search on [facility] and [facility]. Does not think he needs medication management at this time. Will follow up on symptoms at next visit.   - Adult Mental Health  Referral; Future    Constipation, unspecified constipation type  Endorses still being issue despite taking Miralax daily. Affirms working on water and fiber-rich food intake. Will add additional prn medication and reassess for improvement at next " visit.   - sennosides (SENOKOT) 8.6 MG tablet; Take 1 tablet by mouth 2 times daily as needed for constipation    Screening for diabetes mellitus  Per chart review, has history of T2DM but patient thinks there was an error with previous lab testing. A1c 5.5 today while on no meds, thus indicating he does NOT have diabetes.   - Hemoglobin A1c    Essential hypertension, benign  On lisinopril 5 mg daily. /66 here today, which is consistent with home pressures. Wants to trial off medication to see if he even needs it. Will discontinue and have patient follow up in one month. Counseled to continue checking and documenting home pressures.     Screening for hyperlipidemia  WNL.  - Lipid panel reflex to direct LDL Non-fasting    Need for prophylactic vaccination and inoculation against influenza  - INFLUENZA QUAD, RECOMBINANT, P-FREE (RIV4) (FLUBLOK)       MED REC REQUIRED  Post Medication Reconciliation Status:  Discharge medications reconciled and changed, see notes/orders      Return in about 4 weeks (around 10/30/2023) for Follow up, with me.    Danilo Cohen MD  [facility]    Nat Cm is a 53 year old, presenting for the following health issues:  ER F/U (Went to Er sept on 17 on  [facility]), est care  (Primary care ), back pain  (Pain has been going for almost a month. No injuriies ), and Imm/Inj (Flu Shot)        10/2/2023     1:58 PM   Additional Questions   Roomed by padmini ROLLINS   New patient.  Saw  at [place] urgent [facility] 9/6 for epigastric pain/nausea/constipation. Chronic PPI use for reflux gastritis. They increased dose to 40 mg and added carafate. Also added Miralax.   9/11 ED visit: CT concerning for ascending and transverse colon diverticulitis. Started two weeks cipro/flagyl. Also simethicone and nausea meds given.  Did not finish antibiotics - went back for repeat CT and that looked fine. They wanted him to follow up with GI -  "sees them next week.   A lot of upper GI symptoms still. Some nausea, but not nearly as bad, no vomiting. Constant all day, not at night really. Has been avoiding anything acidic or spicy, but not helping. Meds not helpful.  Doing Miralax everyday, helping a bit but still constipated. Has BM every other day, affirms straining and hard stool. Has tried to increase water intake and fiber foods. No black/bloody/tarry stools.  Had a colonoscopy and EGD 2 years ago - negative for H. Pylori.    Smoked from ages 15 to 25, no more than 1 ppd. Hasn't smoked in 27 years ago. 2-3 drinks per week. No other substances. Mental health is \"on and off.\" Thinks has some PTSD from being HIV positive in 90s.  Wants to find therapist. Currently in a 22y relationship with one partner.     Review of Systems   Constitutional, HEENT, cardiovascular, pulmonary, gi and gu systems are negative, except as otherwise noted.      Objective    /66 (BP Location: Right arm)   Pulse 71   Temp 98.1  F (36.7  C) (Tympanic)   Ht 1.728 m (5' 8.05\")   Wt 88 kg (194 lb)   SpO2 99%   BMI 29.45 kg/m    Body mass index is 29.45 kg/m .  Physical Exam   GENERAL: healthy, alert and no distress  EYES: Eyes grossly normal to inspection, PERRL and conjunctivae and sclerae normal  MS: no gross musculoskeletal defects noted, no edema  SKIN: no suspicious lesions or rashes  NEURO: Normal strength and tone, mentation intact and speech normal  PSYCH: mentation appears normal, affect normal/bright    Results for orders placed or performed in visit on 10/02/23   Hemoglobin A1c     Status: Normal   Result Value Ref Range    Hemoglobin A1C 5.5 0.0 - 5.6 %   Lipid panel reflex to direct LDL Non-fasting     Status: Normal   Result Value Ref Range    Cholesterol 116 <200 mg/dL    Triglycerides 74 <150 mg/dL    Direct Measure HDL 51 >=40 mg/dL    LDL Cholesterol Calculated 50 <=100 mg/dL    Non HDL Cholesterol 65 <130 mg/dL    Narrative    Cholesterol  Desirable:  " <200 mg/dL    Triglycerides  Normal:  Less than 150 mg/dL  Borderline High:  150-199 mg/dL  High:  200-499 mg/dL  Very High:  Greater than or equal to 500 mg/dL    Direct Measure HDL  Female:  Greater than or equal to 50 mg/dL   Male:  Greater than or equal to 40 mg/dL    LDL Cholesterol  Desirable:  <100mg/dL  Above Desirable:  100-129 mg/dL   Borderline High:  130-159 mg/dL   High:  160-189 mg/dL   Very High:  >= 190 mg/dL    Non HDL Cholesterol  Desirable:  130 mg/dL  Above Desirable:  130-159 mg/dL  Borderline High:  160-189 mg/dL  High:  190-219 mg/dL  Very High:  Greater than or equal to 220 mg/dL       ----- Service Performed and Documented by Resident or Fellow ------

## 2023-10-02 NOTE — PATIENT INSTRUCTIONS
Nice meeting you today!     1) Lab results will appear on [employer]. I will reach out if I am concerned about anything.     2) Try senna for constipation. Continue daily Miralax.     3) STOP lisinopril and document blood pressures. Bring them with you to next visit.     4) Try [facility] or [facility] therapist network.

## 2023-10-02 NOTE — PROGRESS NOTES
Faculty Supervision of Residents   I have examined this patient and the medical care has been evaluated and discussed with the resident. See resident note outlining our discussion.    Oralia Ramos MD

## 2023-10-03 LAB
CHOLEST SERPL-MCNC: 116 MG/DL
HDLC SERPL-MCNC: 51 MG/DL
LDLC SERPL CALC-MCNC: 50 MG/DL
NONHDLC SERPL-MCNC: 65 MG/DL
TRIGL SERPL-MCNC: 74 MG/DL

## 2023-10-05 PROBLEM — F41.1 GENERALIZED ANXIETY DISORDER: Status: ACTIVE | Noted: 2023-10-05

## 2023-10-05 PROBLEM — K21.00 GASTROESOPHAGEAL REFLUX DISEASE WITH ESOPHAGITIS WITHOUT HEMORRHAGE: Status: ACTIVE | Noted: 2023-10-05

## 2023-10-05 PROBLEM — E11.9 DIABETES MELLITUS, TYPE 2 (H): Status: RESOLVED | Noted: 2023-02-20 | Resolved: 2023-10-05

## 2023-10-05 PROBLEM — K59.00 CONSTIPATION, UNSPECIFIED CONSTIPATION TYPE: Status: ACTIVE | Noted: 2023-10-05

## 2023-10-05 PROBLEM — F41.1 GENERALIZED ANXIETY DISORDER: Status: ACTIVE | Noted: 2019-01-17

## 2023-10-05 PROBLEM — R10.13 EPIGASTRIC PAIN: Status: ACTIVE | Noted: 2023-10-05

## 2023-10-06 ENCOUNTER — LAB (OUTPATIENT)
Dept: LAB | Facility: CLINIC | Age: 53
End: 2023-10-06
Payer: COMMERCIAL

## 2023-10-06 DIAGNOSIS — A09 DIARRHEA OF INFECTIOUS ORIGIN: ICD-10-CM

## 2023-10-06 LAB

## 2023-10-06 PROCEDURE — 99000 SPECIMEN HANDLING OFFICE-LAB: CPT | Performed by: PATHOLOGY

## 2023-10-06 PROCEDURE — 87507 IADNA-DNA/RNA PROBE TQ 12-25: CPT | Mod: XU | Performed by: INTERNAL MEDICINE

## 2023-10-06 PROCEDURE — 87493 C DIFF AMPLIFIED PROBE: CPT | Performed by: INTERNAL MEDICINE

## 2023-10-13 ENCOUNTER — MYC MEDICAL ADVICE (OUTPATIENT)
Dept: FAMILY MEDICINE | Facility: CLINIC | Age: 53
End: 2023-10-13
Payer: COMMERCIAL

## 2023-11-09 ENCOUNTER — DOCUMENTATION ONLY (OUTPATIENT)
Dept: OTHER | Facility: CLINIC | Age: 53
End: 2023-11-09
Payer: COMMERCIAL

## 2024-01-17 ENCOUNTER — LAB (OUTPATIENT)
Dept: LAB | Facility: CLINIC | Age: 54
End: 2024-01-17
Payer: COMMERCIAL

## 2024-01-17 DIAGNOSIS — R73.9 HYPERGLYCEMIA: ICD-10-CM

## 2024-01-17 DIAGNOSIS — Z00.00 HEALTH CARE MAINTENANCE: ICD-10-CM

## 2024-01-17 DIAGNOSIS — B20 HUMAN IMMUNODEFICIENCY VIRUS (HIV) DISEASE (H): ICD-10-CM

## 2024-01-17 LAB
ALBUMIN UR-MCNC: NEGATIVE MG/DL
APPEARANCE UR: CLEAR
BACTERIA #/AREA URNS HPF: ABNORMAL /HPF
BASOPHILS # BLD AUTO: 0 10E3/UL (ref 0–0.2)
BASOPHILS NFR BLD AUTO: 1 %
BILIRUB UR QL STRIP: ABNORMAL
CD3 CELLS # BLD: 1453 CELLS/UL (ref 603–2990)
CD3 CELLS NFR BLD: 84 % (ref 49–84)
CD3+CD4+ CELLS # BLD: 690 CELLS/UL (ref 441–2156)
CD3+CD4+ CELLS NFR BLD: 40 % (ref 28–63)
CD3+CD4+ CELLS/CD3+CD8+ CLL BLD: 0.88 % (ref 1.4–2.6)
CD3+CD8+ CELLS # BLD: 781 CELLS/UL (ref 125–1312)
CD3+CD8+ CELLS NFR BLD: 45 % (ref 10–40)
COLOR UR AUTO: YELLOW
EOSINOPHIL # BLD AUTO: 0.2 10E3/UL (ref 0–0.7)
EOSINOPHIL NFR BLD AUTO: 3 %
ERYTHROCYTE [DISTWIDTH] IN BLOOD BY AUTOMATED COUNT: 12.5 % (ref 10–15)
GLUCOSE UR STRIP-MCNC: NEGATIVE MG/DL
HBA1C MFR BLD: 5.6 % (ref 0–5.6)
HCT VFR BLD AUTO: 51.8 % (ref 40–53)
HGB BLD-MCNC: 17.2 G/DL (ref 13.3–17.7)
HGB UR QL STRIP: ABNORMAL
IMM GRANULOCYTES # BLD: 0 10E3/UL
IMM GRANULOCYTES NFR BLD: 0 %
KETONES UR STRIP-MCNC: NEGATIVE MG/DL
LEUKOCYTE ESTERASE UR QL STRIP: NEGATIVE
LYMPHOCYTES # BLD AUTO: 1.4 10E3/UL (ref 0.8–5.3)
LYMPHOCYTES NFR BLD AUTO: 26 %
MCH RBC QN AUTO: 30.9 PG (ref 26.5–33)
MCHC RBC AUTO-ENTMCNC: 33.2 G/DL (ref 31.5–36.5)
MCV RBC AUTO: 93 FL (ref 78–100)
MONOCYTES # BLD AUTO: 0.6 10E3/UL (ref 0–1.3)
MONOCYTES NFR BLD AUTO: 11 %
MUCOUS THREADS #/AREA URNS LPF: PRESENT /LPF
NEUTROPHILS # BLD AUTO: 3.1 10E3/UL (ref 1.6–8.3)
NEUTROPHILS NFR BLD AUTO: 60 %
NITRATE UR QL: NEGATIVE
PH UR STRIP: 5.5 [PH] (ref 5–7)
PLATELET # BLD AUTO: 172 10E3/UL (ref 150–450)
RBC # BLD AUTO: 5.56 10E6/UL (ref 4.4–5.9)
RBC #/AREA URNS AUTO: ABNORMAL /HPF
SP GR UR STRIP: >=1.03 (ref 1–1.03)
SQUAMOUS #/AREA URNS AUTO: ABNORMAL /LPF
T CELL COMMENT: ABNORMAL
UROBILINOGEN UR STRIP-ACNC: 0.2 E.U./DL
WBC # BLD AUTO: 5.3 10E3/UL (ref 4–11)
WBC #/AREA URNS AUTO: ABNORMAL /HPF

## 2024-01-17 PROCEDURE — 83036 HEMOGLOBIN GLYCOSYLATED A1C: CPT

## 2024-01-17 PROCEDURE — 86359 T CELLS TOTAL COUNT: CPT

## 2024-01-17 PROCEDURE — 87536 HIV-1 QUANT&REVRSE TRNSCRPJ: CPT

## 2024-01-17 PROCEDURE — 36415 COLL VENOUS BLD VENIPUNCTURE: CPT

## 2024-01-17 PROCEDURE — 86360 T CELL ABSOLUTE COUNT/RATIO: CPT

## 2024-01-17 PROCEDURE — 85025 COMPLETE CBC W/AUTO DIFF WBC: CPT

## 2024-01-17 PROCEDURE — 81001 URINALYSIS AUTO W/SCOPE: CPT

## 2024-01-17 PROCEDURE — 80053 COMPREHEN METABOLIC PANEL: CPT

## 2024-01-18 LAB
ALBUMIN SERPL BCG-MCNC: 4.8 G/DL (ref 3.5–5.2)
ALP SERPL-CCNC: 76 U/L (ref 40–150)
ALT SERPL W P-5'-P-CCNC: 40 U/L (ref 0–70)
ANION GAP SERPL CALCULATED.3IONS-SCNC: 10 MMOL/L (ref 7–15)
AST SERPL W P-5'-P-CCNC: 31 U/L (ref 0–45)
BILIRUB SERPL-MCNC: 1 MG/DL
BUN SERPL-MCNC: 19.7 MG/DL (ref 6–20)
CALCIUM SERPL-MCNC: 9.8 MG/DL (ref 8.6–10)
CHLORIDE SERPL-SCNC: 105 MMOL/L (ref 98–107)
CREAT SERPL-MCNC: 1.25 MG/DL (ref 0.67–1.17)
DEPRECATED HCO3 PLAS-SCNC: 28 MMOL/L (ref 22–29)
EGFRCR SERPLBLD CKD-EPI 2021: 69 ML/MIN/1.73M2
GLUCOSE SERPL-MCNC: 65 MG/DL (ref 70–99)
HIV1 RNA # PLAS NAA DL=20: NOT DETECTED COPIES/ML
POTASSIUM SERPL-SCNC: 4.3 MMOL/L (ref 3.4–5.3)
PROT SERPL-MCNC: 7.4 G/DL (ref 6.4–8.3)
SODIUM SERPL-SCNC: 143 MMOL/L (ref 135–145)

## 2024-03-10 ENCOUNTER — HEALTH MAINTENANCE LETTER (OUTPATIENT)
Age: 54
End: 2024-03-10

## 2024-04-17 ENCOUNTER — OFFICE VISIT (OUTPATIENT)
Dept: PHYSICAL MEDICINE AND REHAB | Facility: CLINIC | Age: 54
End: 2024-04-17
Payer: COMMERCIAL

## 2024-04-17 ENCOUNTER — HOSPITAL ENCOUNTER (OUTPATIENT)
Dept: GENERAL RADIOLOGY | Facility: HOSPITAL | Age: 54
Discharge: HOME | End: 2024-04-17
Attending: STUDENT IN AN ORGANIZED HEALTH CARE EDUCATION/TRAINING PROGRAM | Admitting: STUDENT IN AN ORGANIZED HEALTH CARE EDUCATION/TRAINING PROGRAM
Payer: COMMERCIAL

## 2024-04-17 VITALS
HEIGHT: 69 IN | SYSTOLIC BLOOD PRESSURE: 144 MMHG | HEART RATE: 72 BPM | BODY MASS INDEX: 27.34 KG/M2 | WEIGHT: 184.6 LBS | OXYGEN SATURATION: 99 % | DIASTOLIC BLOOD PRESSURE: 84 MMHG

## 2024-04-17 DIAGNOSIS — M54.16 LUMBAR RADICULOPATHY: Primary | ICD-10-CM

## 2024-04-17 DIAGNOSIS — M54.16 LUMBAR RADICULOPATHY: ICD-10-CM

## 2024-04-17 DIAGNOSIS — M51.369 LUMBAR DEGENERATIVE DISC DISEASE: ICD-10-CM

## 2024-04-17 PROCEDURE — 99204 OFFICE O/P NEW MOD 45 MIN: CPT | Performed by: STUDENT IN AN ORGANIZED HEALTH CARE EDUCATION/TRAINING PROGRAM

## 2024-04-17 PROCEDURE — 72110 X-RAY EXAM L-2 SPINE 4/>VWS: CPT

## 2024-04-17 ASSESSMENT — PAIN SCALES - GENERAL: PAINLEVEL: MODERATE PAIN (4)

## 2024-04-17 NOTE — PROGRESS NOTES
ASSESSMENT:  Toi Gresham is a 53 year old male presents for consultation at the request of No ref. provider found who presents today for new patient evaluation of :         Diagnoses and all orders for this visit:  Lumbar radiculopathy  -     EMG; Future  -     MR Lumbar Spine w/o Contrast; Future  -     XR Lumbar Spine G/E 4 Views; Future  Lumbar degenerative disc disease  -     EMG; Future  -     MR Lumbar Spine w/o Contrast; Future  -     XR Lumbar Spine G/E 4 Views; Future       Patient is neurologically intact on exam. No myelopathic or red flag symptoms.    Patient is a 53-year-old male who presents for evaluation of chronic low back pain and left-sided lumbosacral radiculopathy approximating an S1 distribution.  On exam he has weakness in foot inversion and toe flexion.  We reviewed previous imaging and diagnostics that were done from when he was in [place] including an MRI lumbar from 2020 which showed mild to moderate multilevel stenosis in the lower lumbar segments, and an EMG from 2021 which showed possible tibial neuropathy versus S1 radiculopathy.  Given the 3 to 4 years out of elapsed since these diagnostics were done, I think the patient would benefit from repeat EMG and MRI to get a better sense of the current state of neural elements and soft tissue structures.  Will also order x-ray with bending views to assess for dynamic instability.  Plan to follow-up once all imaging is completed.    PLAN:  Reviewed spine anatomy and disease process. Discussed diagnosis and treatment options with the patient today. A shared decision making model was used. The patient's values and choices were respected. The following represents what was discussed and decided upon by the provider and the patient.    1. DIAGNOSTIC TESTS  X-ray of lumbar spine with bending views was ordered for further evaluation  MRI of lumbar spine was ordered for further characterization of soft tissues and/or neural compression  EMG of right   lower extremities was ordered for further evaluation  EMG to evaluate for left tibial neuropathy versus S1 radiculopathy    2. PHYSICAL THERAPY  Patient is already performing a home program, and was encouraged to continue doing so.  Discussed the importance of core strengthening, ROM, stretching exercises with the patient and how each of these entities is important in decreasing pain.  Explained to the patient that the purpose of physical therapy is to teach the patient a home exercise program.  These exercises need to be performed every day in order to decrease pain and prevent future occurrences of pain.  Likened it to brushing one's teeth.      3. MEDICATIONS:  Discussed multiple medication options today with patient. Discussed risks, side effects, and proper use of medications. Patient verbalized understanding.  No new medications ordered at this visit.    4. INTERVENTIONS:  Patient requires further imaging to assess what interventional options may be best for them.    5. OTHER REFERRALS:  No other referrals at this time.    6. FOLLOW-UP  To review imaging results once imaging is completed  Patient advised to contact our office for earlier appointment if symptoms worsening or not improving, or any side effects are noticed.    Advised patient to call the [facility] if symptoms worsen or you have problems controlling bladder and bowel function.   ______________________________________________________________________    SUBJECTIVE:   Toi Gresham  is a 53 year old male who presents today for new patient evaluation.    Patient reports that he has had low back pain for 10+ years, he feels that this started when he was on a boat in [facility] and fell landing in a split fashion.  Since then he has had low back and left buttock pain going down the left leg.  He was previously seen at [facility] in [place], with imaging PT and 2 injections done there which did not yield significant  benefit.  He did not undergo any surgeries.    At present the pain is located still in the left side lower back and the buttock, and radiates down the back of the left leg to the foot.  Pain worsens with sitting and improves with lying flat on his back or walking.  He does get associated paresthesias in the left leg, and has noticed that his left foot is in an everted position at rest whereas the right is normally positioned.  He has also noticed weakness in toe flexion on the left side while the right side is normal.    No tonio numbness or saddle anesthesia, no bladder or bowel incontinence reported.    Patient endorses a history of HIV with years of undetectable viral load and good CD4 counts.    No prior back surgeries    -Treatment to Date: As above      Oswestry (LUZ MARIA) Questionnaire         No data to display                Neck Disability Index:       No data to display                       -Medications:    Current Outpatient Medications   Medication Sig Dispense Refill    bictegravir-emtricitabine-tenofovir (BIKTARVY) -25 MG per tablet Take 1 tablet by mouth daily 90 tablet 3    omeprazole (PRILOSEC) 20 MG DR capsule Take 1 capsule (20 mg) by mouth daily 90 capsule 4    polyethylene glycol (MIRALAX) 17 GM/Dose powder Take 17 g (1 Capful) by mouth daily 510 g 0    sennosides (SENOKOT) 8.6 MG tablet Take 1 tablet by mouth 2 times daily as needed for constipation 60 tablet 1     No current facility-administered medications for this visit.       No Known Allergies    Past Medical History:   Diagnosis Date    Gastroesophageal reflux disease without esophagitis     HTN (hypertension)     Human immunodeficiency virus (HIV) disease (H)         Patient Active Problem List   Diagnosis    Human immunodeficiency virus (HIV) disease (H)    Lumbar radiculopathy    Essential hypertension, benign    HIV positive (H)    Generalized anxiety disorder    Constipation, unspecified constipation type    Gastroesophageal  "reflux disease with esophagitis without hemorrhage    Epigastric pain       No past surgical history on file.    No family history on file.    Reviewed past medical, surgical, and family history with patient found on new patient intake packet located in EMR Media tab.     SOCIAL HX: Reviewed    ROS: Specifically negative for bowel/bladder dysfunction, balance changes, headache, dizziness, foot drop, fevers, chills, appetite changes, nausea/vomiting, unexplained weight loss. Otherwise 13 systems reviewed are negative. Please see the patient's intake questionnaire from today for details.    OBJECTIVE:  BP (!) 144/84 (BP Location: Right arm, Patient Position: Sitting, Cuff Size: Adult Regular)   Pulse 72   Ht 5' 9\" (1.753 m)   Wt 184 lb 9.6 oz (83.7 kg)   SpO2 99%   BMI 27.26 kg/m      PHYSICAL EXAMINATION:  --CONSTITUTIONAL: Vital signs as above. No acute distress. The patient is well nourished and well groomed.  --PSYCHIATRIC: The patient is awake, alert, oriented to person, place, time and answering questions appropriately with clear speech. Appropriate mood and affect   --RESPIRATORY: Normal rhythm and effort. No abnormal accessory muscle breathing patterns noted.   --GROSS MOTOR: Easily arises from a seated position.  --LUMBAR SPINE: Inspection reveals no evidence of deformity. Range of motion is not limited in flexion, extension, lateral rotation. Mild tenderness to palpation of lumbar paraspinals, no tenderness in spinous processes.  Facet loading (Sandhu test) negative, seated SLR positive on the left side  --HIPS: Full range of motion bilaterally. Negative JIMENEZ test.  --LOWER EXTREMITY MOTOR TESTING:  Hip flexion left 5/5, right 5/5  Knee extension left 5/5, right 5/5  Ankle dorsiflexors left 5/5, right 5/5  Ankle plantarflexors left 5/5, right 5/5   Great toe extension left 5/5, right 5/5   Ankle inversion left 4/5, right 5/5  Toe flexion left 4/5, right 5/5  Knee flexion left 5/5, right " 5/5  --NEUROLOGIC: Sensation to lower extremities is intact bilaterally in L2-S1 dermatomes.  Negative Goodman's bilaterally. Reflexes intact in BLE, no clonus.  --VASCULAR: Warm upper limbs bilaterally. Capillary refill in the upper extremities is less than 1 second.    RESULTS: Available medical records from [facility] and any other outside records were reviewed today.     Imaging:  Available relevant imaging was personally reviewed and interpreted today. The images were shown to the patient and the findings were explained using a spine model.    Reports from outside health systems are noted below.    2020 MRI lumbar spine:  At L1-L2:  There is no evidence of disc herniation, spinal stenosis or foraminal   compromise.     At L2-L3:   There is no evidence of disc herniation, spinal stenosis or   foraminal compromise.     At L3-L4:  Broad-based posterior disc bulge resulting in mild bilateral   foraminal narrowing and mild ventral thecal sac effacement.     At L4-L5:  Broad-based posterior disc bulge resulting in moderate bilateral   neuroforaminal narrowing and mild ventral thecal sac effacement.     At L5-S1:   Broad-based posterior central disc bulge resulting in mild ventral   thecal sac effacement. No neuroforaminal narrowing.     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  Please correlate clinically.

## 2024-04-17 NOTE — LETTER
4/17/2024         RE: oTi Gresham  [address] [facility] [place] [address]        Dear Colleague,    Thank you for referring your patient, Toi Gresham, to the [facility]. Please see a copy of my visit note below.    ASSESSMENT:  Toi Gresham is a 53 year old male presents for consultation at the request of No ref. provider found who presents today for new patient evaluation of :         Diagnoses and all orders for this visit:  Lumbar radiculopathy  -     EMG; Future  -     MR Lumbar Spine w/o Contrast; Future  -     XR Lumbar Spine G/E 4 Views; Future  Lumbar degenerative disc disease  -     EMG; Future  -     MR Lumbar Spine w/o Contrast; Future  -     XR Lumbar Spine G/E 4 Views; Future       Patient is neurologically intact on exam. No myelopathic or red flag symptoms.    Patient is a 53-year-old male who presents for evaluation of chronic low back pain and left-sided lumbosacral radiculopathy approximating an S1 distribution.  On exam he has weakness in foot inversion and toe flexion.  We reviewed previous imaging and diagnostics that were done from when he was in [place] including an MRI lumbar from 2020 which showed mild to moderate multilevel stenosis in the lower lumbar segments, and an EMG from 2021 which showed possible tibial neuropathy versus S1 radiculopathy.  Given the 3 to 4 years out of elapsed since these diagnostics were done, I think the patient would benefit from repeat EMG and MRI to get a better sense of the current state of neural elements and soft tissue structures.  Will also order x-ray with bending views to assess for dynamic instability.  Plan to follow-up once all imaging is completed.    PLAN:  Reviewed spine anatomy and disease process. Discussed diagnosis and treatment options with the patient today. A shared decision making model was used. The patient's values and choices were respected. The following represents what was discussed and decided upon by  the provider and the patient.    1. DIAGNOSTIC TESTS  X-ray of lumbar spine with bending views was ordered for further evaluation  MRI of lumbar spine was ordered for further characterization of soft tissues and/or neural compression  EMG of right  lower extremities was ordered for further evaluation  EMG to evaluate for left tibial neuropathy versus S1 radiculopathy    2. PHYSICAL THERAPY  Patient is already performing a home program, and was encouraged to continue doing so.  Discussed the importance of core strengthening, ROM, stretching exercises with the patient and how each of these entities is important in decreasing pain.  Explained to the patient that the purpose of physical therapy is to teach the patient a home exercise program.  These exercises need to be performed every day in order to decrease pain and prevent future occurrences of pain.  Likened it to brushing one's teeth.      3. MEDICATIONS:  Discussed multiple medication options today with patient. Discussed risks, side effects, and proper use of medications. Patient verbalized understanding.  No new medications ordered at this visit.    4. INTERVENTIONS:  Patient requires further imaging to assess what interventional options may be best for them.    5. OTHER REFERRALS:  No other referrals at this time.    6. FOLLOW-UP  To review imaging results once imaging is completed  Patient advised to contact our office for earlier appointment if symptoms worsening or not improving, or any side effects are noticed.    Advised patient to call the Spine Center if symptoms worsen or you have problems controlling bladder and bowel function.   ______________________________________________________________________    SUBJECTIVE:   Toi Gresham  is a 53 year old male who presents today for new patient evaluation.    Patient reports that he has had low back pain for 10+ years, he feels that this started when he was on a boat in [place] [facility] [place] and fell landing in a  split fashion.  Since then he has had low back and left buttock pain going down the left leg.  He was previously seen at [facility] in [place], with imaging PT and 2 injections done there which did not yield significant benefit.  He did not undergo any surgeries.    At present the pain is located still in the left side lower back and the buttock, and radiates down the back of the left leg to the foot.  Pain worsens with sitting and improves with lying flat on his back or walking.  He does get associated paresthesias in the left leg, and has noticed that his left foot is in an everted position at rest whereas the right is normally positioned.  He has also noticed weakness in toe flexion on the left side while the right side is normal.    No tonio numbness or saddle anesthesia, no bladder or bowel incontinence reported.    Patient endorses a history of HIV with years of undetectable viral load and good CD4 counts.    No prior back surgeries    -Treatment to Date: As above      Oswestry (LUZ MARIA) Questionnaire         No data to display                Neck Disability Index:       No data to display                       -Medications:    Current Outpatient Medications   Medication Sig Dispense Refill     bictegravir-emtricitabine-tenofovir (BIKTARVY) -25 MG per tablet Take 1 tablet by mouth daily 90 tablet 3     omeprazole (PRILOSEC) 20 MG DR capsule Take 1 capsule (20 mg) by mouth daily 90 capsule 4     polyethylene glycol (MIRALAX) 17 GM/Dose powder Take 17 g (1 Capful) by mouth daily 510 g 0     sennosides (SENOKOT) 8.6 MG tablet Take 1 tablet by mouth 2 times daily as needed for constipation 60 tablet 1     No current facility-administered medications for this visit.       No Known Allergies    Past Medical History:   Diagnosis Date     Gastroesophageal reflux disease without esophagitis      HTN (hypertension)      Human immunodeficiency virus (HIV) disease (H)         Patient Active  "Problem List   Diagnosis     Human immunodeficiency virus (HIV) disease (H)     Lumbar radiculopathy     Essential hypertension, benign     HIV positive (H)     Generalized anxiety disorder     Constipation, unspecified constipation type     Gastroesophageal reflux disease with esophagitis without hemorrhage     Epigastric pain       No past surgical history on file.    No family history on file.    Reviewed past medical, surgical, and family history with patient found on new patient intake packet located in EMR Media tab.     SOCIAL HX: Reviewed    ROS: Specifically negative for bowel/bladder dysfunction, balance changes, headache, dizziness, foot drop, fevers, chills, appetite changes, nausea/vomiting, unexplained weight loss. Otherwise 13 systems reviewed are negative. Please see the patient's intake questionnaire from today for details.    OBJECTIVE:  BP (!) 144/84 (BP Location: Right arm, Patient Position: Sitting, Cuff Size: Adult Regular)   Pulse 72   Ht 5' 9\" (1.753 m)   Wt 184 lb 9.6 oz (83.7 kg)   SpO2 99%   BMI 27.26 kg/m      PHYSICAL EXAMINATION:  --CONSTITUTIONAL: Vital signs as above. No acute distress. The patient is well nourished and well groomed.  --PSYCHIATRIC: The patient is awake, alert, oriented to person, place, time and answering questions appropriately with clear speech. Appropriate mood and affect   --RESPIRATORY: Normal rhythm and effort. No abnormal accessory muscle breathing patterns noted.   --GROSS MOTOR: Easily arises from a seated position.  --LUMBAR SPINE: Inspection reveals no evidence of deformity. Range of motion is not limited in flexion, extension, lateral rotation. Mild tenderness to palpation of lumbar paraspinals, no tenderness in spinous processes.  Facet loading (Sandhu test) negative, seated SLR positive on the left side  --HIPS: Full range of motion bilaterally. Negative JIMENEZ test.  --LOWER EXTREMITY MOTOR TESTING:  Hip flexion left 5/5, right 5/5  Knee extension " left 5/5, right 5/5  Ankle dorsiflexors left 5/5, right 5/5  Ankle plantarflexors left 5/5, right 5/5   Great toe extension left 5/5, right 5/5   Ankle inversion left 4/5, right 5/5  Toe flexion left 4/5, right 5/5  Knee flexion left 5/5, right 5/5  --NEUROLOGIC: Sensation to lower extremities is intact bilaterally in L2-S1 dermatomes.  Negative Goodman's bilaterally. Reflexes intact in BLE, no clonus.  --VASCULAR: Warm upper limbs bilaterally. Capillary refill in the upper extremities is less than 1 second.    RESULTS: Available medical records from [facility] and any other outside records were reviewed today.     Imaging:  Available relevant imaging was personally reviewed and interpreted today. The images were shown to the patient and the findings were explained using a spine model.    Reports from outside health systems are noted below.    2020 MRI lumbar spine:  At L1-L2:  There is no evidence of disc herniation, spinal stenosis or foraminal   compromise.     At L2-L3:   There is no evidence of disc herniation, spinal stenosis or   foraminal compromise.     At L3-L4:  Broad-based posterior disc bulge resulting in mild bilateral   foraminal narrowing and mild ventral thecal sac effacement.     At L4-L5:  Broad-based posterior disc bulge resulting in moderate bilateral   neuroforaminal narrowing and mild ventral thecal sac effacement.     At L5-S1:   Broad-based posterior central disc bulge resulting in mild ventral   thecal sac effacement. No neuroforaminal narrowing.     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  Please correlate clinically.          Again, thank you for allowing me to participate in the care of your patient.        Sincerely,        Brad Drew MD

## 2024-04-22 ENCOUNTER — OFFICE VISIT (OUTPATIENT)
Dept: PHYSICAL MEDICINE AND REHAB | Facility: CLINIC | Age: 54
End: 2024-04-22
Attending: STUDENT IN AN ORGANIZED HEALTH CARE EDUCATION/TRAINING PROGRAM
Payer: COMMERCIAL

## 2024-04-22 DIAGNOSIS — M51.369 LUMBAR DEGENERATIVE DISC DISEASE: ICD-10-CM

## 2024-04-22 DIAGNOSIS — M54.16 LUMBAR RADICULOPATHY: ICD-10-CM

## 2024-04-22 PROCEDURE — 95911 NRV CNDJ TEST 9-10 STUDIES: CPT | Performed by: PHYSICAL MEDICINE & REHABILITATION

## 2024-04-22 PROCEDURE — 95886 MUSC TEST DONE W/N TEST COMP: CPT | Performed by: PHYSICAL MEDICINE & REHABILITATION

## 2024-04-22 NOTE — PATIENT INSTRUCTIONS
Thank you for choosing the [facility] for your EMG testing.    The ordering provider will receive your final EMG results within the next few days.  Please follow up with your provider for the results and further treatment recommendations.

## 2024-04-22 NOTE — PROGRESS NOTES
[facility]  [address] [place] [address] [address]  [place] [address]  Office: [phone] Fax: [phone]    Electromyography and Nerve Conduction Study Report        Indication:.  Feels weakness in the left ankle.  On exam, he has normal sensation to light touch through the lower extremities, 2+ patellar and Achilles reflexes bilaterally with downgoing toes, and normal muscle strength throughout the major muscle groups of the bilateral lower extremities.        Pt Exam Discussion (Communication Barriers):  Electromyography and nerve conduction testing, including associated discomfort, risks, benefits, and alternatives was discussed with the patient prior to the procedure.  No learning/ communication barriers; patient verbalized understanding of procedure.  Informed consent was obtained.           Pt Assessment:  Testing was successfully completed; patient tolerated testing well.       Blood Thinners: None   Skin Temperature: Warmed 32.4                   EMG/NCS  results:     Nerve Conduction Studies  Motor Sites      Segment Distal Latency Neg. Amp CV F-Latency F-Estimate Comment   Site  (ms) (mV) (m/s) (ms) (ms)    Left Fibular (EDB) Motor   Ankle Ankle-EDB 4.1 6.4       Fib Head Fib Head-Ankle 11.4 5.6 42      Knee Knee-Ankle 13.5 5.4 44      Left Tibial (AH) Motor   Ankle Ankle-AH 3.0 14.2       Knee Knee-Ankle 12.5 9.2 45        Sensory Sites      Onset Lat Peak Lat Amp CV Comment   Site (ms) (ms) ( V) (m/s)    Left Lateral Plantar Sensory   Lat Sole-Ankle 2.8 3.1 4 46    Right Lateral Plantar Sensory   Lat Sole-Ankle 3.0 3.6 *3 43    Left Medial Plantar Sensory   Med Sole-Ankle 2.6 3.1 *7 50    Right Medial Plantar Sensory   Med Sole-Ankle 2.9 3.5 *6 45    Left Sural Sensory   B-Ankle 3.3 3.9 *6 -      H-Reflex Sites      M-Lat H Lat H Neg Amp   Site (ms) (ms) (mV)   Left Tibial (Soleus) H-Reflex   Pop Fossa 6.5 34.4 1.01   Right Tibial (Soleus) H-Reflex   Pop Fossa 6.0 34.8 1.47      H-Reflex Sites      Lt. H Lat Rt. H Lat L-R H Lat L-R H Neg Amp   Site (ms) (ms) (ms) Norm (mV)   Tibial (Soleus) H-Reflex   Pop Fossa 34.4 34.8 0.40  < 3.0 0.46       NCS Waveforms:    Motor         Sensory                  H-Reflex           Electromyography     Side Muscle Nerve Root Ins Act Fibs Psw Fasc Recrt Dur Amp Poly Comment   Left AntTibialis Dp Br Fibular L4-5 Nml Nml Nml Nml Nml Nml Nml 0    Left Gastroc Tibial S1-2 Nml Nml Nml Nml Nml Nml Nml 0    Left Fibularis Long Sup Br Fibular L5-S1 Nml Nml Nml Nml Nml Nml Nml 0    Left VastusLat Femoral L2-4 Nml Nml Nml Nml Nml Nml Nml 0    Left TensorFascLat SupGluteal L4-5, S1 Nml Nml Nml Nml Nml Nml Nml 0          Comment NCS: Normal study  1.  Normal nerve conduction studies left lower extremity.  This includes normal left sural SNAP, left peroneal and tibial CMAP's, bilateral medial plantar and lateral plantar responses with comparison and symmetric tibial H reflexes.    Comment EMG: Normal study  1.  Normal needle EMG left lower extremity.    Interpretation: Normal study    1. There is no electrodiagnostic evidence of lumbosacral radiculopathy, lumbosacral plexopathy, or focal neuropathy in the left lower extremity.  Specifically, there is no electrodiagnostic evidence of tibial neuropathy at the tarsal tunnel or S1 radiculopathy in the left lower extremity.    The testing was completed in its entirety by the physician.      It was our pleasure caring for your patient today, if there any questions or concerns please do not hesitate to contact us.     patient presents at the request of Dr. Brad Drew for a left lower extremity EMG.  He has several year history of left gluteal and lower extremity pain on the posterior thigh not past the knee.  He also has numbness and tingling bottom of the left foot and medial left ankle

## 2024-04-22 NOTE — LETTER
4/22/2024         RE: Toi Gresham  [address] [facility] [address]        Dear Colleague,    Thank you for referring your patient, Toi Gresham, to the [facility] SPINE AND NEUROSURGERY. Please see a copy of my visit note below.    [facility]  [address] [place] [id number]  Office: [phone] Fax: [phone]    Electromyography and Nerve Conduction Study Report        Indication:.  Feels weakness in the left ankle.  On exam, he has normal sensation to light touch through the lower extremities, 2+ patellar and Achilles reflexes bilaterally with downgoing toes, and normal muscle strength throughout the major muscle groups of the bilateral lower extremities.        Pt Exam Discussion (Communication Barriers):  Electromyography and nerve conduction testing, including associated discomfort, risks, benefits, and alternatives was discussed with the patient prior to the procedure.  No learning/ communication barriers; patient verbalized understanding of procedure.  Informed consent was obtained.           Pt Assessment:  Testing was successfully completed; patient tolerated testing well.       Blood Thinners: None   Skin Temperature: Warmed 32.4                   EMG/NCS  results:     Nerve Conduction Studies  Motor Sites      Segment Distal Latency Neg. Amp CV F-Latency F-Estimate Comment   Site  (ms) (mV) (m/s) (ms) (ms)    Left Fibular (EDB) Motor   Ankle Ankle-EDB 4.1 6.4       Fib Head Fib Head-Ankle 11.4 5.6 42      Knee Knee-Ankle 13.5 5.4 44      Left Tibial (AH) Motor   Ankle Ankle-AH 3.0 14.2       Knee Knee-Ankle 12.5 9.2 45        Sensory Sites      Onset Lat Peak Lat Amp CV Comment   Site (ms) (ms) ( V) (m/s)    Left Lateral Plantar Sensory   Lat Sole-Ankle 2.8 3.1 4 46    Right Lateral Plantar Sensory   Lat Sole-Ankle 3.0 3.6 *3 43    Left Medial Plantar Sensory   Med Sole-Ankle 2.6 3.1 *7 50    Right Medial Plantar Sensory   Med  Sole-Ankle 2.9 3.5 *6 45    Left Sural Sensory   B-Ankle 3.3 3.9 *6 -      H-Reflex Sites      M-Lat H Lat H Neg Amp   Site (ms) (ms) (mV)   Left Tibial (Soleus) H-Reflex   Pop Fossa 6.5 34.4 1.01   Right Tibial (Soleus) H-Reflex   Pop Fossa 6.0 34.8 1.47     H-Reflex Sites      Lt. H Lat Rt. H Lat L-R H Lat L-R H Neg Amp   Site (ms) (ms) (ms) Norm (mV)   Tibial (Soleus) H-Reflex   Pop Fossa 34.4 34.8 0.40  < 3.0 0.46       NCS Waveforms:    Motor         Sensory                  H-Reflex           Electromyography     Side Muscle Nerve Root Ins Act Fibs Psw Fasc Recrt Dur Amp Poly Comment   Left AntTibialis Dp Br Fibular L4-5 Nml Nml Nml Nml Nml Nml Nml 0    Left Gastroc Tibial S1-2 Nml Nml Nml Nml Nml Nml Nml 0    Left Fibularis Long Sup Br Fibular L5-S1 Nml Nml Nml Nml Nml Nml Nml 0    Left VastusLat Femoral L2-4 Nml Nml Nml Nml Nml Nml Nml 0    Left TensorFascLat SupGluteal L4-5, S1 Nml Nml Nml Nml Nml Nml Nml 0          Comment NCS: Normal study  1.  Normal nerve conduction studies left lower extremity.  This includes normal left sural SNAP, left peroneal and tibial CMAP's, bilateral medial plantar and lateral plantar responses with comparison and symmetric tibial H reflexes.    Comment EMG: Normal study  1.  Normal needle EMG left lower extremity.    Interpretation: Normal study    1. There is no electrodiagnostic evidence of lumbosacral radiculopathy, lumbosacral plexopathy, or focal neuropathy in the left lower extremity.  Specifically, there is no electrodiagnostic evidence of tibial neuropathy at the tarsal tunnel or S1 radiculopathy in the left lower extremity.    The testing was completed in its entirety by the physician.      It was our pleasure caring for your patient today, if there any questions or concerns please do not hesitate to contact us.     patient presents at the request of Dr. Brad Drew for a left lower extremity EMG.  He has several year history of left gluteal and lower extremity pain  on the posterior thigh not past the knee.  He also has numbness and tingling bottom of the left foot and medial left ankle    Again, thank you for allowing me to participate in the care of your patient.        Sincerely,        Jewel Mccullough, DO

## 2024-04-23 ENCOUNTER — TELEPHONE (OUTPATIENT)
Dept: PHYSICAL MEDICINE AND REHAB | Facility: CLINIC | Age: 54
End: 2024-04-23

## 2024-04-23 NOTE — TELEPHONE ENCOUNTER
----- Message from ADIN Pierson CNP sent at 4/23/2024 12:04 PM CDT -----  Please let Toi know that his EMG results were normal. I am covering for Dr Drew until he returns next week. Recommend making a follow up appt with Dr Drew to review both EMG and MRI results in more detail after he has his MRI.

## 2024-04-28 ENCOUNTER — HOSPITAL ENCOUNTER (OUTPATIENT)
Dept: MRI IMAGING | Facility: HOSPITAL | Age: 54
Discharge: HOME | End: 2024-04-28
Attending: STUDENT IN AN ORGANIZED HEALTH CARE EDUCATION/TRAINING PROGRAM | Admitting: STUDENT IN AN ORGANIZED HEALTH CARE EDUCATION/TRAINING PROGRAM
Payer: COMMERCIAL

## 2024-04-28 DIAGNOSIS — M51.369 LUMBAR DEGENERATIVE DISC DISEASE: ICD-10-CM

## 2024-04-28 DIAGNOSIS — M54.16 LUMBAR RADICULOPATHY: ICD-10-CM

## 2024-04-28 PROCEDURE — 72148 MRI LUMBAR SPINE W/O DYE: CPT

## 2024-04-29 ENCOUNTER — TELEPHONE (OUTPATIENT)
Dept: PHYSICAL MEDICINE AND REHAB | Facility: CLINIC | Age: 54
End: 2024-04-29
Payer: COMMERCIAL

## 2024-04-29 NOTE — TELEPHONE ENCOUNTER
Phone call to patient to review results and provider's recommendations. Results given and explained. Patient does have an appointment for 5/13/24. Would like to get on cancellation list for 4/30 or 5/1 should something open up. Note sent to schedulers.

## 2024-04-29 NOTE — TELEPHONE ENCOUNTER
----- Message from Brda Drew MD sent at 4/29/2024  9:02 AM CDT -----  MRI does show impingement of S1 nerve on left side which we had suspected. Recommend follow up to review EMG and MRI and discuss next steps

## 2024-05-01 ENCOUNTER — OFFICE VISIT (OUTPATIENT)
Dept: PHYSICAL MEDICINE AND REHAB | Facility: CLINIC | Age: 54
End: 2024-05-01
Payer: COMMERCIAL

## 2024-05-01 VITALS
BODY MASS INDEX: 26.69 KG/M2 | HEART RATE: 66 BPM | DIASTOLIC BLOOD PRESSURE: 83 MMHG | HEIGHT: 69 IN | SYSTOLIC BLOOD PRESSURE: 138 MMHG | OXYGEN SATURATION: 100 % | WEIGHT: 180.2 LBS

## 2024-05-01 DIAGNOSIS — M54.16 LUMBAR RADICULOPATHY: ICD-10-CM

## 2024-05-01 DIAGNOSIS — M51.369 LUMBAR DEGENERATIVE DISC DISEASE: Primary | ICD-10-CM

## 2024-05-01 PROCEDURE — 99214 OFFICE O/P EST MOD 30 MIN: CPT | Performed by: STUDENT IN AN ORGANIZED HEALTH CARE EDUCATION/TRAINING PROGRAM

## 2024-05-01 ASSESSMENT — PAIN SCALES - GENERAL: PAINLEVEL: MILD PAIN (3)

## 2024-05-01 NOTE — LETTER
5/1/2024         RE: Toi Gresham  [address] [facility] [address]        Dear Colleague,    Thank you for referring your patient, Toi Gresham, to the [facility]. Please see a copy of my visit note below.    ASSESSMENT:  Toi Gresham is a 53 year old male presents for consultation at the request of No ref. provider found who presents today for new patient evaluation of :         Diagnoses and all orders for this visit:  Lumbar degenerative disc disease  -     PAIN Transforaminal CAROLYN Inj Lumbosacral Left; Future  Lumbar radiculopathy  -     PAIN Transforaminal CAROLYN Inj Lumbosacral Left; Future       Patient is neurologically intact on exam. No myelopathic or red flag symptoms.    Patient presents for follow-up and imaging review related to his left-sided low back pain and lumbosacral radiculopathy approximating an S1 distribution.  His EMG was unrevealing but his MRI does show an L5-S1 disc bulge which, in combination with moderate facet arthropathy does abut on the descending left S1 nerve root.  We discussed that this may be related to his symptoms, and discussed possible treatment options ranging from conservative through surgical.  After discussing the possible treatment options, patient would like to try an injection again along with Keyur/directional PT.    PLAN:  Reviewed spine anatomy and disease process. Discussed diagnosis and treatment options with the patient today. A shared decision making model was used. The patient's values and choices were respected. The following represents what was discussed and decided upon by the provider and the patient.    1. DIAGNOSTIC TESTS  No new imaging orders at this time.      2. PHYSICAL THERAPY  Patient is already performing a home program, and was encouraged to continue doing so.  Plan for referral to Keyur therapy after injection is completed  Discussed the importance of core strengthening, ROM, stretching exercises with  the patient and how each of these entities is important in decreasing pain.  Explained to the patient that the purpose of physical therapy is to teach the patient a home exercise program.  These exercises need to be performed every day in order to decrease pain and prevent future occurrences of pain.  Likened it to brushing one's teeth.      3. MEDICATIONS:  Discussed multiple medication options today with patient. Discussed risks, side effects, and proper use of medications. Patient verbalized understanding.  No new medications ordered at this visit.    4. INTERVENTIONS:  Left L5-S1 Transforaminal Epidural Steroid Injection  Benefits of epidural steroid injection were reviewed including potential improvements in pain, function, and sleep. Potential risks also reviewed, including but not limited to bleeding, bruising, infection, increased or non-improvement of pain, injury to local structures, spinal infection or bleeding, nerve or spinal cord injury which could be temporary or permanent. Risks of steroid use can include increased blood sugar or blood pressure, hormonal disruption, increased fracture risk. After reviewing the risks, benefits, and alternatives, the patient was given an opportunity to ask questions. All questions were addressed, and the patient wishes to move forward with the selected injection.    5. OTHER REFERRALS:  No other referrals at this time.    6. FOLLOW-UP  In approximately 2-4 weeks to assess response to injection.  Patient advised to contact our office for earlier appointment if symptoms worsening or not improving, or any side effects are noticed.    Advised patient to call the Spine Center if symptoms worsen or you have problems controlling bladder and bowel function.   ______________________________________________________________________    SUBJECTIVE:   Toi Gresham  is a 53 year old male who presents today for follow-up evaluation.    Patient reports no significant changes in  symptoms.  Continues to have left-sided low back and buttock pain going down the back of the left leg to the foot.  This continues to be associated with paresthesias in the left leg.  Pain worsens with prolonged sitting and improves with standing or lying down.    No tonio numbness, weakness, or saddle anesthesia, no bladder or bowel incontinence reported.    Patient endorses a history of HIV with years of undetectable viral load and good CD4 counts.    No prior back surgeries    -Treatment to Date: As above.  Previously had left L5-S1 TFESI in July 2021 at [facility] in [place].      Oswestry (LUZ MARIA) Questionnaire         No data to display                Neck Disability Index:       No data to display                       -Medications:    Current Outpatient Medications   Medication Sig Dispense Refill     bictegravir-emtricitabine-tenofovir (BIKTARVY) -25 MG per tablet Take 1 tablet by mouth daily 90 tablet 3     omeprazole (PRILOSEC) 20 MG DR capsule Take 1 capsule (20 mg) by mouth daily 90 capsule 4     polyethylene glycol (MIRALAX) 17 GM/Dose powder Take 17 g (1 Capful) by mouth daily 510 g 0     sennosides (SENOKOT) 8.6 MG tablet Take 1 tablet by mouth 2 times daily as needed for constipation 60 tablet 1     No current facility-administered medications for this visit.       No Known Allergies    Past Medical History:   Diagnosis Date     Gastroesophageal reflux disease without esophagitis      HTN (hypertension)      Human immunodeficiency virus (HIV) disease (H)         Patient Active Problem List   Diagnosis     Human immunodeficiency virus (HIV) disease (H)     Lumbar radiculopathy     Essential hypertension, benign     HIV positive (H)     Generalized anxiety disorder     Constipation, unspecified constipation type     Gastroesophageal reflux disease with esophagitis without hemorrhage     Epigastric pain       No past surgical history on file.    No family history on file.    Reviewed past  "medical, surgical, and family history with patient found on new patient intake packet located in EMR Media tab.     SOCIAL HX: Reviewed    ROS: Specifically negative for bowel/bladder dysfunction, balance changes, headache, dizziness, foot drop, fevers, chills, appetite changes, nausea/vomiting, unexplained weight loss. Otherwise 13 systems reviewed are negative. Please see the patient's intake questionnaire from today for details.    OBJECTIVE:  /83 (BP Location: Left arm, Patient Position: Sitting, Cuff Size: Adult Regular)   Pulse 66   Ht 5' 9\" (1.753 m)   Wt 180 lb 3.2 oz (81.7 kg)   SpO2 100%   BMI 26.61 kg/m      PHYSICAL EXAMINATION: Reviewed and updated as needed  --CONSTITUTIONAL: Vital signs as above. No acute distress. The patient is well nourished and well groomed.  --PSYCHIATRIC: The patient is awake, alert, oriented to person, place, time and answering questions appropriately with clear speech. Appropriate mood and affect   --RESPIRATORY: Normal rhythm and effort. No abnormal accessory muscle breathing patterns noted.   --GROSS MOTOR: Easily arises from a seated position.  --LUMBAR SPINE: Inspection reveals no evidence of deformity. Range of motion is not limited in flexion, extension, lateral rotation. Mild tenderness to palpation of lumbar paraspinals, no tenderness in spinous processes.  Facet loading (Sandhu test) negative, seated SLR positive on the left side  --HIPS: Full range of motion bilaterally.  --LOWER EXTREMITY MOTOR TESTING:  Hip flexion left 5/5, right 5/5  Knee extension left 5/5, right 5/5  Ankle dorsiflexors left 5/5, right 5/5  Ankle plantarflexors left 5/5, right 5/5   Great toe extension left 5/5, right 5/5   Ankle inversion left 4/5, right 5/5  Toe flexion left 4/5, right 5/5  Knee flexion left 5/5, right 5/5  --NEUROLOGIC: Sensation to lower extremities is intact bilaterally in L2-S1 dermatomes.  Negative Goodman's bilaterally. Reflexes intact in BLE, no " clonus.  --VASCULAR: Warm upper limbs bilaterally. Capillary refill in the upper extremities is less than 1 second.    RESULTS: Available medical records from [facility] and any other outside records were reviewed today.     Imaging:  Available relevant imaging was personally reviewed and interpreted today. The images were shown to the patient and the findings were explained using a spine model.    4/22/24 EMG Left Lower Ext:  Interpretation: Normal study     1. There is no electrodiagnostic evidence of lumbosacral radiculopathy, lumbosacral plexopathy, or focal neuropathy in the left lower extremity.  Specifically, there is no electrodiagnostic evidence of tibial neuropathy at the tarsal tunnel or S1 radiculopathy in the left lower extremity.    4/28/24 MRI Lumbar Spine:  IMPRESSION:  1.  At the L5/S1 level, there is a symmetric disc bulge, posterior annular fissure and moderate facet arthropathy with abutment the descending left S1 nerve root without definite evidence of impingement. Correlation with left S1 radiculopathy is   suggested.  2.  Additional mild degenerative lumbar spondylosis with level by level analysis as described above without evidence of high-grade spinal canal and neural foraminal narrowing at any level.    Reports from outside health systems are noted below.    9/29/2020 MRI lumbar spine:  At L1-L2:  There is no evidence of disc herniation, spinal stenosis or foraminal   compromise.     At L2-L3:   There is no evidence of disc herniation, spinal stenosis or   foraminal compromise.     At L3-L4:  Broad-based posterior disc bulge resulting in mild bilateral   foraminal narrowing and mild ventral thecal sac effacement.     At L4-L5:  Broad-based posterior disc bulge resulting in moderate bilateral   neuroforaminal narrowing and mild ventral thecal sac effacement.     At L5-S1:   Broad-based posterior central disc bulge resulting in mild ventral   thecal sac effacement. No neuroforaminal  narrowing.     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  Please correlate clinically.          Again, thank you for allowing me to participate in the care of your patient.        Sincerely,        Brad Drew MD

## 2024-05-01 NOTE — PATIENT INSTRUCTIONS
~Spine Center Scheduling #[phone].  ~Please call our [facility] Spine Nurse Navigation #[phone] with any questions or concerns about your treatment plan, if symptoms worsen and you would like to be seen urgently, or if you have problems controlling bladder and bowel function.  ~For any future flareups or new symptoms, recommend follow-up in clinic or contact the nurse navigator line.  ~Please note that any My Chart messages may take multiple days for a response due to the high volume of patients seen in clinic.  Anything sent Friday night or after will be answered the following week when able.     An injection has been ordered today to potentially help with your pain symptoms. These injections do not fix what is going on in your back, therefore they typically do not take away the pain completely, however they can many times help improve symptoms. Injections should always be completed along with other modalities such as physical therapy for the best long term outcomes. If injections alone are done, then pain will likely return.     [facility] Injection Requirements    A  is required for all fluoroscopically-guided injections.  Injection appointments may be cancelled if there are signs/symptoms of an active infection or if the patient is being actively treated with antibiotics for a diagnosed infection.  Patients may have their steroid injection cancelled if they have had another steroid injection within 2 weeks.  Diabetic patients will have their blood glucose levels checked the day of their injection and the appointment will be rescheduled if the blood glucose level is 300 or higher.  Patients with allergies to cortisone, local anesthetics, iodine, or contrast dye should contact the Spine Center to further discuss these considerations.  Patients scheduled for medial branch block diagnostic injections should refrain from taking pain medication the day of the  procedure.  The medial branch block injection appointment will be rescheduled if the patient's pain rating is not 5/10 or greater at the time of the procedure.  Patients taking warfarin/Coumadin will have their INR checked the day of the procedure and the procedure may be rescheduled if the INR is greater than 3.0.  Please contact the Spine Center (#[phone]) if you are taking any prescription blood-thinning medications (warfarin, Plavix, Lovenox, Eliquis, Brilinta, Effient, etc.) as special dosing adjustments may need to be made depending on the type of injection you are scheduled to receive.  It is recommended that you delay having your steroid injection if you have received a flu shot or shingles vaccine within 2 weeks.

## 2024-05-01 NOTE — PROGRESS NOTES
ASSESSMENT:  Toi Gresham is a 53 year old male presents for consultation at the request of No ref. provider found who presents today for new patient evaluation of :         Diagnoses and all orders for this visit:  Lumbar degenerative disc disease  -     PAIN Transforaminal CAROLYN Inj Lumbosacral Left; Future  Lumbar radiculopathy  -     PAIN Transforaminal CAROLYN Inj Lumbosacral Left; Future       Patient is neurologically intact on exam. No myelopathic or red flag symptoms.    Patient presents for follow-up and imaging review related to his left-sided low back pain and lumbosacral radiculopathy approximating an S1 distribution.  His EMG was unrevealing but his MRI does show an L5-S1 disc bulge which, in combination with moderate facet arthropathy does abut on the descending left S1 nerve root.  We discussed that this may be related to his symptoms, and discussed possible treatment options ranging from conservative through surgical.  After discussing the possible treatment options, patient would like to try an injection again along with Keyur/directional PT.    PLAN:  Reviewed spine anatomy and disease process. Discussed diagnosis and treatment options with the patient today. A shared decision making model was used. The patient's values and choices were respected. The following represents what was discussed and decided upon by the provider and the patient.    1. DIAGNOSTIC TESTS  No new imaging orders at this time.      2. PHYSICAL THERAPY  Patient is already performing a home program, and was encouraged to continue doing so.  Plan for referral to Keyur therapy after injection is completed  Discussed the importance of core strengthening, ROM, stretching exercises with the patient and how each of these entities is important in decreasing pain.  Explained to the patient that the purpose of physical therapy is to teach the patient a home exercise program.  These exercises need to be performed every day in order to  decrease pain and prevent future occurrences of pain.  Likened it to brushing one's teeth.      3. MEDICATIONS:  Discussed multiple medication options today with patient. Discussed risks, side effects, and proper use of medications. Patient verbalized understanding.  No new medications ordered at this visit.    4. INTERVENTIONS:  Left L5-S1 Transforaminal Epidural Steroid Injection  Benefits of epidural steroid injection were reviewed including potential improvements in pain, function, and sleep. Potential risks also reviewed, including but not limited to bleeding, bruising, infection, increased or non-improvement of pain, injury to local structures, spinal infection or bleeding, nerve or spinal cord injury which could be temporary or permanent. Risks of steroid use can include increased blood sugar or blood pressure, hormonal disruption, increased fracture risk. After reviewing the risks, benefits, and alternatives, the patient was given an opportunity to ask questions. All questions were addressed, and the patient wishes to move forward with the selected injection.    5. OTHER REFERRALS:  No other referrals at this time.    6. FOLLOW-UP  In approximately 2-4 weeks to assess response to injection.  Patient advised to contact our office for earlier appointment if symptoms worsening or not improving, or any side effects are noticed.    Advised patient to call the Spine Center if symptoms worsen or you have problems controlling bladder and bowel function.   ______________________________________________________________________    SUBJECTIVE:   Toi Gresham  is a 53 year old male who presents today for follow-up evaluation.    Patient reports no significant changes in symptoms.  Continues to have left-sided low back and buttock pain going down the back of the left leg to the foot.  This continues to be associated with paresthesias in the left leg.  Pain worsens with prolonged sitting and improves with standing or  lying down.    No tonio numbness, weakness, or saddle anesthesia, no bladder or bowel incontinence reported.    Patient endorses a history of HIV with years of undetectable viral load and good CD4 counts.    No prior back surgeries    -Treatment to Date: As above.  Previously had left L5-S1 TFESI in July 2021 at [facility] in [place].      Oswestry (LUZ MARIA) Questionnaire         No data to display                Neck Disability Index:       No data to display                       -Medications:    Current Outpatient Medications   Medication Sig Dispense Refill    bictegravir-emtricitabine-tenofovir (BIKTARVY) -25 MG per tablet Take 1 tablet by mouth daily 90 tablet 3    omeprazole (PRILOSEC) 20 MG DR capsule Take 1 capsule (20 mg) by mouth daily 90 capsule 4    polyethylene glycol (MIRALAX) 17 GM/Dose powder Take 17 g (1 Capful) by mouth daily 510 g 0    sennosides (SENOKOT) 8.6 MG tablet Take 1 tablet by mouth 2 times daily as needed for constipation 60 tablet 1     No current facility-administered medications for this visit.       No Known Allergies    Past Medical History:   Diagnosis Date    Gastroesophageal reflux disease without esophagitis     HTN (hypertension)     Human immunodeficiency virus (HIV) disease (H)         Patient Active Problem List   Diagnosis    Human immunodeficiency virus (HIV) disease (H)    Lumbar radiculopathy    Essential hypertension, benign    HIV positive (H)    Generalized anxiety disorder    Constipation, unspecified constipation type    Gastroesophageal reflux disease with esophagitis without hemorrhage    Epigastric pain       No past surgical history on file.    No family history on file.    Reviewed past medical, surgical, and family history with patient found on new patient intake packet located in EMR Media tab.     SOCIAL HX: Reviewed    ROS: Specifically negative for bowel/bladder dysfunction, balance changes, headache, dizziness, foot drop, fevers, chills,  "appetite changes, nausea/vomiting, unexplained weight loss. Otherwise 13 systems reviewed are negative. Please see the patient's intake questionnaire from today for details.    OBJECTIVE:  /83 (BP Location: Left arm, Patient Position: Sitting, Cuff Size: Adult Regular)   Pulse 66   Ht 5' 9\" (1.753 m)   Wt 180 lb 3.2 oz (81.7 kg)   SpO2 100%   BMI 26.61 kg/m      PHYSICAL EXAMINATION: Reviewed and updated as needed  --CONSTITUTIONAL: Vital signs as above. No acute distress. The patient is well nourished and well groomed.  --PSYCHIATRIC: The patient is awake, alert, oriented to person, place, time and answering questions appropriately with clear speech. Appropriate mood and affect   --RESPIRATORY: Normal rhythm and effort. No abnormal accessory muscle breathing patterns noted.   --GROSS MOTOR: Easily arises from a seated position.  --LUMBAR SPINE: Inspection reveals no evidence of deformity. Range of motion is not limited in flexion, extension, lateral rotation. Mild tenderness to palpation of lumbar paraspinals, no tenderness in spinous processes.  Facet loading (Sandhu test) negative, seated SLR positive on the left side  --HIPS: Full range of motion bilaterally.  --LOWER EXTREMITY MOTOR TESTING:  Hip flexion left 5/5, right 5/5  Knee extension left 5/5, right 5/5  Ankle dorsiflexors left 5/5, right 5/5  Ankle plantarflexors left 5/5, right 5/5   Great toe extension left 5/5, right 5/5   Ankle inversion left 4/5, right 5/5  Toe flexion left 4/5, right 5/5  Knee flexion left 5/5, right 5/5  --NEUROLOGIC: Sensation to lower extremities is intact bilaterally in L2-S1 dermatomes.  Negative Goodman's bilaterally. Reflexes intact in BLE, no clonus.  --VASCULAR: Warm upper limbs bilaterally. Capillary refill in the upper extremities is less than 1 second.    RESULTS: Available medical records from [facility] and any other outside records were reviewed today.     Imaging:  Available relevant imaging was " personally reviewed and interpreted today. The images were shown to the patient and the findings were explained using a spine model.    24 EMG Left Lower Ext:  Interpretation: Normal study     1. There is no electrodiagnostic evidence of lumbosacral radiculopathy, lumbosacral plexopathy, or focal neuropathy in the left lower extremity.  Specifically, there is no electrodiagnostic evidence of tibial neuropathy at the tarsal tunnel or S1 radiculopathy in the left lower extremity.    24 MRI Lumbar Spine:  IMPRESSION:  1.  At the L5/S1 level, there is a symmetric disc bulge, posterior annular fissure and moderate facet arthropathy with abutment the descending left S1 nerve root without definite evidence of impingement. Correlation with left S1 radiculopathy is   suggested.  2.  Additional mild degenerative lumbar spondylosis with level by level analysis as described above without evidence of high-grade spinal canal and neural foraminal narrowing at any level.    Reports from outside health systems are noted below.    2020 MRI lumbar spine:  At L1-L2:  There is no evidence of disc herniation, spinal stenosis or foraminal   compromise.     At L2-L3:   There is no evidence of disc herniation, spinal stenosis or   foraminal compromise.     At L3-L4:  Broad-based posterior disc bulge resulting in mild bilateral   foraminal narrowing and mild ventral thecal sac effacement.     At L4-L5:  Broad-based posterior disc bulge resulting in moderate bilateral   neuroforaminal narrowing and mild ventral thecal sac effacement.     At L5-S1:   Broad-based posterior central disc bulge resulting in mild ventral   thecal sac effacement. No neuroforaminal narrowing.     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  Please correlate clinically.

## 2024-06-15 ENCOUNTER — APPOINTMENT (OUTPATIENT)
Dept: ULTRASOUND IMAGING | Facility: CLINIC | Age: 54
End: 2024-06-15
Attending: EMERGENCY MEDICINE
Payer: COMMERCIAL

## 2024-06-15 ENCOUNTER — HOSPITAL ENCOUNTER (EMERGENCY)
Facility: CLINIC | Age: 54
Discharge: HOME | End: 2024-06-15
Attending: EMERGENCY MEDICINE | Admitting: EMERGENCY MEDICINE
Payer: COMMERCIAL

## 2024-06-15 VITALS
TEMPERATURE: 98.6 F | OXYGEN SATURATION: 100 % | RESPIRATION RATE: 16 BRPM | HEART RATE: 77 BPM | SYSTOLIC BLOOD PRESSURE: 134 MMHG | DIASTOLIC BLOOD PRESSURE: 100 MMHG

## 2024-06-15 DIAGNOSIS — R10.9 ABDOMINAL DISCOMFORT: ICD-10-CM

## 2024-06-15 DIAGNOSIS — K80.20 SYMPTOMATIC CHOLELITHIASIS: ICD-10-CM

## 2024-06-15 LAB
ALBUMIN SERPL BCG-MCNC: 4.6 G/DL (ref 3.5–5.2)
ALP SERPL-CCNC: 79 U/L (ref 40–150)
ALT SERPL W P-5'-P-CCNC: 9 U/L (ref 0–70)
ANION GAP SERPL CALCULATED.3IONS-SCNC: 12 MMOL/L (ref 7–15)
AST SERPL W P-5'-P-CCNC: 23 U/L (ref 0–45)
BASOPHILS # BLD AUTO: 0 10E3/UL (ref 0–0.2)
BASOPHILS NFR BLD AUTO: 0 %
BILIRUB SERPL-MCNC: 1 MG/DL
BUN SERPL-MCNC: 13.2 MG/DL (ref 6–20)
CALCIUM SERPL-MCNC: 10.3 MG/DL (ref 8.6–10)
CHLORIDE SERPL-SCNC: 103 MMOL/L (ref 98–107)
CREAT SERPL-MCNC: 1.11 MG/DL (ref 0.67–1.17)
DEPRECATED HCO3 PLAS-SCNC: 24 MMOL/L (ref 22–29)
EGFRCR SERPLBLD CKD-EPI 2021: 79 ML/MIN/1.73M2
EOSINOPHIL # BLD AUTO: 0 10E3/UL (ref 0–0.7)
EOSINOPHIL NFR BLD AUTO: 0 %
ERYTHROCYTE [DISTWIDTH] IN BLOOD BY AUTOMATED COUNT: 12.5 % (ref 10–15)
GLUCOSE SERPL-MCNC: 89 MG/DL (ref 70–99)
HCT VFR BLD AUTO: 48.7 % (ref 40–53)
HGB BLD-MCNC: 16.7 G/DL (ref 13.3–17.7)
IMM GRANULOCYTES # BLD: 0 10E3/UL
IMM GRANULOCYTES NFR BLD: 0 %
LIPASE SERPL-CCNC: 26 U/L (ref 13–60)
LYMPHOCYTES # BLD AUTO: 1.1 10E3/UL (ref 0.8–5.3)
LYMPHOCYTES NFR BLD AUTO: 14 %
MCH RBC QN AUTO: 31.5 PG (ref 26.5–33)
MCHC RBC AUTO-ENTMCNC: 34.3 G/DL (ref 31.5–36.5)
MCV RBC AUTO: 92 FL (ref 78–100)
MONOCYTES # BLD AUTO: 0.7 10E3/UL (ref 0–1.3)
MONOCYTES NFR BLD AUTO: 10 %
NEUTROPHILS # BLD AUTO: 5.7 10E3/UL (ref 1.6–8.3)
NEUTROPHILS NFR BLD AUTO: 76 %
NRBC # BLD AUTO: 0 10E3/UL
NRBC BLD AUTO-RTO: 0 /100
PLATELET # BLD AUTO: 215 10E3/UL (ref 150–450)
POTASSIUM SERPL-SCNC: 4.5 MMOL/L (ref 3.4–5.3)
PROT SERPL-MCNC: 7.4 G/DL (ref 6.4–8.3)
RBC # BLD AUTO: 5.31 10E6/UL (ref 4.4–5.9)
SODIUM SERPL-SCNC: 139 MMOL/L (ref 135–145)
WBC # BLD AUTO: 7.6 10E3/UL (ref 4–11)

## 2024-06-15 PROCEDURE — 250N000013 HC RX MED GY IP 250 OP 250 PS 637: Performed by: EMERGENCY MEDICINE

## 2024-06-15 PROCEDURE — 76705 ECHO EXAM OF ABDOMEN: CPT | Mod: 26 | Performed by: RADIOLOGY

## 2024-06-15 PROCEDURE — 99284 EMERGENCY DEPT VISIT MOD MDM: CPT | Mod: 25 | Performed by: EMERGENCY MEDICINE

## 2024-06-15 PROCEDURE — 85041 AUTOMATED RBC COUNT: CPT | Performed by: EMERGENCY MEDICINE

## 2024-06-15 PROCEDURE — 76705 ECHO EXAM OF ABDOMEN: CPT

## 2024-06-15 PROCEDURE — 99284 EMERGENCY DEPT VISIT MOD MDM: CPT | Performed by: EMERGENCY MEDICINE

## 2024-06-15 PROCEDURE — 80053 COMPREHEN METABOLIC PANEL: CPT | Performed by: EMERGENCY MEDICINE

## 2024-06-15 PROCEDURE — 250N000009 HC RX 250: Performed by: EMERGENCY MEDICINE

## 2024-06-15 PROCEDURE — 36415 COLL VENOUS BLD VENIPUNCTURE: CPT | Performed by: EMERGENCY MEDICINE

## 2024-06-15 PROCEDURE — 83690 ASSAY OF LIPASE: CPT | Performed by: EMERGENCY MEDICINE

## 2024-06-15 RX ORDER — MAGNESIUM HYDROXIDE/ALUMINUM HYDROXICE/SIMETHICONE 120; 1200; 1200 MG/30ML; MG/30ML; MG/30ML
15 SUSPENSION ORAL ONCE
Status: COMPLETED | OUTPATIENT
Start: 2024-06-15 | End: 2024-06-15

## 2024-06-15 RX ORDER — LIDOCAINE HYDROCHLORIDE 20 MG/ML
10 SOLUTION OROPHARYNGEAL ONCE
Status: COMPLETED | OUTPATIENT
Start: 2024-06-15 | End: 2024-06-15

## 2024-06-15 RX ADMIN — ALUMINUM HYDROXIDE, MAGNESIUM HYDROXIDE, AND SIMETHICONE 15 ML: 1200; 120; 1200 SUSPENSION ORAL at 14:06

## 2024-06-15 RX ADMIN — LIDOCAINE HYDROCHLORIDE 10 ML: 20 SOLUTION OROPHARYNGEAL at 14:06

## 2024-06-15 ASSESSMENT — ACTIVITIES OF DAILY LIVING (ADL)
ADLS_ACUITY_SCORE: 35
ADLS_ACUITY_SCORE: 33

## 2024-06-15 NOTE — ED PROVIDER NOTES
[employer] EMERGENCY DEPARTMENT ([facility])    6/15/24       ED PROVIDER NOTE    History     Chief Complaint   Patient presents with    Nausea     HPI  Toi Gresham is a 53 year old male with a past medical history of GERD who presents to the ED for evaluation of nausea and abdominal discomfort.  Patient reports that he has had chronic issues with abdominal pain, was evaluated last September at our facility here at which time he was given prescription for omeprazole with possible mild improvement, however his pain is ongoing and worsened in the last few days.  Patient reports abdominal bloating, and pain in the epigastric and right upper quadrant region symptoms associated with eating and sometimes improved with eating.  Of note he does have an endoscopy scheduled for Monday, 2 days from now on 6/17/2024.  He has not had an endoscopy previously.  He describes the pain as intermittent, sometimes severe, sometimes burning and radiating up towards his chest like reflux.  He does not have a known diagnosis for this at this time.    Past Medical History  Past Medical History:   Diagnosis Date    Gastroesophageal reflux disease without esophagitis     HTN (hypertension)     Human immunodeficiency virus (HIV) disease (H)      No past surgical history on file.  bictegravir-emtricitabine-tenofovir (BIKTARVY) -25 MG per tablet  omeprazole (PRILOSEC) 20 MG DR capsule  polyethylene glycol (MIRALAX) 17 GM/Dose powder  sennosides (SENOKOT) 8.6 MG tablet      No Known Allergies  Family History  No family history on file.  Social History   Social History     Tobacco Use    Smoking status: Former     Current packs/day: 10.00     Types: Cigarettes    Smokeless tobacco: Never   Substance Use Topics    Alcohol use: Yes     Comment: 2 to 3 drinks weekly    Drug use: Never      Past medical history, past surgical history, medications, allergies, family history, and social history were reviewed with the patient. No  additional pertinent items.   A complete review of systems was performed with pertinent positives and negatives noted in the HPI, and all other systems negative.    Physical Exam   BP: (!) 134/100  Pulse: 77  Temp: 98.6  F (37  C)  Resp: 16  SpO2: 100 %  Physical Exam  Vitals and nursing note reviewed.   Constitutional:       General: He is not in acute distress.     Appearance: Normal appearance. He is not toxic-appearing.   HENT:      Head: Atraumatic.   Eyes:      General: No scleral icterus.     Conjunctiva/sclera: Conjunctivae normal.   Cardiovascular:      Rate and Rhythm: Normal rate.      Heart sounds: Normal heart sounds.   Pulmonary:      Effort: Pulmonary effort is normal. No respiratory distress.      Breath sounds: Normal breath sounds.   Abdominal:      Palpations: Abdomen is soft.      Tenderness: There is abdominal tenderness. There is no guarding or rebound.      Comments: RUQ abdominal tenderness, negative jon sign    Musculoskeletal:         General: No deformity.      Cervical back: Neck supple.   Skin:     General: Skin is warm and dry.   Neurological:      General: No focal deficit present.      Mental Status: He is alert and oriented to person, place, and time.   Psychiatric:         Mood and Affect: Mood normal.         Behavior: Behavior normal.           ED Course, Procedures, & Data      Procedures                Results for orders placed or performed during the hospital encounter of 06/15/24   US Abdomen Limited     Status: None (Preliminary result)    Impression    RESIDENT PRELIMINARY INTERPRETATION  IMPRESSION:   1. Cholelithiasis with stone seen in the gallbladder neck. No  sonographic evidence to suggest acute cholecystitis.  2. Increased echogenicity of the hepatic parenchyma, nonspecific, but  favored to represent sequelae of fatty liver deposition.   Comprehensive metabolic panel     Status: Abnormal   Result Value Ref Range    Sodium 139 135 - 145 mmol/L    Potassium 4.5 3.4 -  5.3 mmol/L    Carbon Dioxide (CO2) 24 22 - 29 mmol/L    Anion Gap 12 7 - 15 mmol/L    Urea Nitrogen 13.2 6.0 - 20.0 mg/dL    Creatinine 1.11 0.67 - 1.17 mg/dL    GFR Estimate 79 >60 mL/min/1.73m2    Calcium 10.3 (H) 8.6 - 10.0 mg/dL    Chloride 103 98 - 107 mmol/L    Glucose 89 70 - 99 mg/dL    Alkaline Phosphatase 79 40 - 150 U/L    AST 23 0 - 45 U/L    ALT 9 0 - 70 U/L    Protein Total 7.4 6.4 - 8.3 g/dL    Albumin 4.6 3.5 - 5.2 g/dL    Bilirubin Total 1.0 <=1.2 mg/dL   Lipase     Status: Normal   Result Value Ref Range    Lipase 26 13 - 60 U/L   CBC with platelets and differential     Status: None   Result Value Ref Range    WBC Count 7.6 4.0 - 11.0 10e3/uL    RBC Count 5.31 4.40 - 5.90 10e6/uL    Hemoglobin 16.7 13.3 - 17.7 g/dL    Hematocrit 48.7 40.0 - 53.0 %    MCV 92 78 - 100 fL    MCH 31.5 26.5 - 33.0 pg    MCHC 34.3 31.5 - 36.5 g/dL    RDW 12.5 10.0 - 15.0 %    Platelet Count 215 150 - 450 10e3/uL    % Neutrophils 76 %    % Lymphocytes 14 %    % Monocytes 10 %    % Eosinophils 0 %    % Basophils 0 %    % Immature Granulocytes 0 %    NRBCs per 100 WBC 0 <1 /100    Absolute Neutrophils 5.7 1.6 - 8.3 10e3/uL    Absolute Lymphocytes 1.1 0.8 - 5.3 10e3/uL    Absolute Monocytes 0.7 0.0 - 1.3 10e3/uL    Absolute Eosinophils 0.0 0.0 - 0.7 10e3/uL    Absolute Basophils 0.0 0.0 - 0.2 10e3/uL    Absolute Immature Granulocytes 0.0 <=0.4 10e3/uL    Absolute NRBCs 0.0 10e3/uL   CBC with Platelets & Differential     Status: None    Narrative    The following orders were created for panel order CBC with Platelets & Differential.  Procedure                               Abnormality         Status                     ---------                               -----------         ------                     CBC with platelets and d...[[id number]]                      Final result                 Please view results for these tests on the individual orders.     Medications   alum & mag hydroxide-simethicone (MAALOX) suspension 15  mL (15 mLs Oral $Given 6/15/24 1406)   lidocaine (viscous) (XYLOCAINE) 2 % solution 10 mL (10 mLs Mouth/Throat $Given 6/15/24 1406)     Labs Ordered and Resulted from Time of ED Arrival to Time of ED Departure   COMPREHENSIVE METABOLIC PANEL - Abnormal       Result Value    Sodium 139      Potassium 4.5      Carbon Dioxide (CO2) 24      Anion Gap 12      Urea Nitrogen 13.2      Creatinine 1.11      GFR Estimate 79      Calcium 10.3 (*)     Chloride 103      Glucose 89      Alkaline Phosphatase 79      AST 23      ALT 9      Protein Total 7.4      Albumin 4.6      Bilirubin Total 1.0     LIPASE - Normal    Lipase 26     CBC WITH PLATELETS AND DIFFERENTIAL    WBC Count 7.6      RBC Count 5.31      Hemoglobin 16.7      Hematocrit 48.7      MCV 92      MCH 31.5      MCHC 34.3      RDW 12.5      Platelet Count 215      % Neutrophils 76      % Lymphocytes 14      % Monocytes 10      % Eosinophils 0      % Basophils 0      % Immature Granulocytes 0      NRBCs per 100 WBC 0      Absolute Neutrophils 5.7      Absolute Lymphocytes 1.1      Absolute Monocytes 0.7      Absolute Eosinophils 0.0      Absolute Basophils 0.0      Absolute Immature Granulocytes 0.0      Absolute NRBCs 0.0       US Abdomen Limited   Preliminary Result   RESIDENT PRELIMINARY INTERPRETATION   IMPRESSION:    1. Cholelithiasis with stone seen in the gallbladder neck. No   sonographic evidence to suggest acute cholecystitis.   2. Increased echogenicity of the hepatic parenchyma, nonspecific, but   favored to represent sequelae of fatty liver deposition.             Critical care was not performed.     Medical Decision Making  The patient's presentation was of moderate complexity (an undiagnosed new problem with uncertain prognosis).    The patient's evaluation involved:  review of external note(s) from 1 sources (see separate area of note for details)  review of 3+ test result(s) ordered prior to this encounter (see separate area of note for  details)  ordering and/or review of 3+ test(s) in this encounter (see separate area of note for details)    The patient's management necessitated only low risk treatment.    Assessment & Plan      Toi Gresham is a 53 year old male with a past medical history of GERD who presents to the ED for evaluation of nausea and abdominal discomfort.  Patient is nontoxic-appearing on exam, his vital signs within normal limits with exception of a mildly elevated blood pressure at 134/100.  Patient has tenderness in the right upper quadrant on exam without rebound or guarding.  No additional focal tenderness on abdominal exam.  Labs were obtained today as well as the ultrasound of the right upper quadrant.  Patient does have a noted gallstone in the neck of the gallbladder, no signs of pericholecystic fluid, no signs of cholecystitis, no signs of ascending cholangitis.  This is a new finding for the patient and he was notified of the natural history of gallstone disease and given a general surgery referral.  We did discuss return precautions including fevers.  Of note he does have an endoscopy in 2 days which is appropriate follow-up in an expedited fashion.  Patient was discharged with outpatient follow-up plan in place and return precautions.    I have reviewed the nursing notes. I have reviewed the findings, diagnosis, plan and need for follow up with the patient.    Discharge Medication List as of 6/15/2024  4:56 PM          Final diagnoses:   Symptomatic cholelithiasis   Abdominal discomfort       Chao Mathias MD  [facility] EMERGENCY DEPARTMENT  6/15/2024     Chao Mathias MD  06/15/24 [id number]

## 2024-06-15 NOTE — PROGRESS NOTES
Pt arrives to the ED c/o chronic nausea, current episode lasting the last 4 days. Was last seen in the ED in September for same issue and was given omeprazole.

## 2024-06-17 ENCOUNTER — NURSE TRIAGE (OUTPATIENT)
Dept: NURSING | Facility: CLINIC | Age: 54
End: 2024-06-17
Payer: COMMERCIAL

## 2024-06-17 ENCOUNTER — PATIENT OUTREACH (OUTPATIENT)
Dept: CARE COORDINATION | Facility: CLINIC | Age: 54
End: 2024-06-17
Payer: COMMERCIAL

## 2024-06-17 NOTE — PROGRESS NOTES
Clinic Care Coordination Contact  Follow Up Progress Note      Assessment: The pt was recently in the ED, I called to check up on the pt and help the pt setup a ED follow up. The pt was at [facility] for nausea. I called and talked to the pt, pt stated that he is doing better. Pt will call back later to schedule a follow up.    Care Gaps:    Health Maintenance Due   Topic Date Due    YEARLY PREVENTIVE VISIT  Never done    HEPATITIS A IMMUNIZATION (1 of 2 - Risk 2-dose series) Never done    HEPATITIS B IMMUNIZATION (1 of 3 - 19+ 3-dose series) Never done    LUNG CANCER SCREENING  Never done           Care Plans      Intervention/Education provided during outreach:               Plan:     Care Coordinator will follow up in

## 2024-06-17 NOTE — TELEPHONE ENCOUNTER
REFERRAL INFORMATION:  Referring Provider: Dr. Mathias  Referring Clinic:  [facility] - ED  Reason for Visit/Diagnosis: Symptomatic cholelithiasis        FUTURE VISIT INFORMATION:  Appointment Date: 7/1/2024  Appointment Time: Noon     NOTES RECORD STATUS  DETAILS   OFFICE NOTE from Referring Provider N/A    OFFICE NOTE from Other Specialists Care Everywhere / Internal [facility]:  2/26/24, 10/13/23 - GI OV with Dr. Harman  10/25/23 - PCC OV with Dr. Lisbeth Trimble - [place]:  10/2/23 - PCC OV with Dr. Cohen   [facility] DISCHARGE SUMMARY/ ED VISITS  Care Everywhere / Internal [facility]:  6/15/24 - ED OV with Dr. Mathias  9/11/23 - ED OV with Dr. Clem Gracia:  9/15/23 - ED OV with Dr. Cooper   OPERATIVE REPORT N/A    ENDOSCOPY (EGD)  Care Everywhere [facility]:  6/17/24 - EGD   PERTINENT LABS Care Everywhere / Internal    IMAGING (CT, MRI, US, XR)  Received / Internal [facility]:  6/15/24 - US Abdomen  9/11/23 - CT Abd/Pelvis    [facility]:  9/16/23 - CT Abd/Pelvis     Records Requested    Facility  [facility]  Fax: [phone]   Outcome * 6/17/24 9:34 AM Faxed urgent request to [facility] for images to be pushed to [place] [facility]s. - [place]    * 6/19/24 10:09 AM Images received from [facility] and attached to the patient in PACs. - [facility]

## 2024-06-18 NOTE — TELEPHONE ENCOUNTER
Toi reports Increased Upper GI discomfort.    He has been having symptoms for months that have recently gotten worse.    Today, he had an EGD performed with an [facility] Provider. The provider was aware of his ongoing symptoms.  Toi feels that his symptoms ar worse tonight than they were earlier today.    He was seen in the ED on 6/15/24. At that time, he was told to call or return to ED if he developed Fever or Chills.    Yesterday he started having Chills and an elevation in body temp - 99.6  oral    Worsening sx's:  - Nausea  - Burning pain  - Cramps  - Bloating  - Discomfort    Stopped taking GERD medications about a month ago because he felt that they were not making any difference.    Took Maalox before calling. Currently using a heating pad.    Per Protocol, ED advised  Since he was seen today & 2 days ago, suggested that he might consider contacting on-call GI provider (&/or on-call PCP w/ [facility])     Reiterated ED advice  Pt will try to contact on-call GI provider.    Caridad Montes RN  [facility]      Reason for Disposition   [1] Pain lasts > 10 minutes AND [2] age > 50    Additional Information   Negative: SEVERE difficulty breathing (e.g., struggling for each breath, speaks in single words)   Negative: Shock suspected (e.g., cold/pale/clammy skin, too weak to stand, low BP, rapid pulse)   Negative: Difficult to awaken or acting confused (e.g., disoriented, slurred speech)   Negative: Passed out (i.e., lost consciousness, collapsed and was not responding)   Negative: Visible sweat on face or sweat dripping down face   Negative: Sounds like a life-threatening emergency to the triager   Negative: [1] SEVERE pain (e.g., excruciating) AND [2] present > 1 hour    Protocols used: Abdominal Pain - Upper-A-AH

## 2024-06-19 ENCOUNTER — TELEPHONE (OUTPATIENT)
Dept: INFECTIOUS DISEASES | Facility: CLINIC | Age: 54
End: 2024-06-19
Payer: COMMERCIAL

## 2024-06-19 NOTE — TELEPHONE ENCOUNTER
EP called 6/19 to sched a next avail follow up with Dr. Jean per pt's request but then he wants to wait because provider is booked out until August.

## 2024-06-22 ENCOUNTER — HOSPITAL ENCOUNTER (EMERGENCY)
Facility: CLINIC | Age: 54
Discharge: HOME | End: 2024-06-22
Attending: EMERGENCY MEDICINE | Admitting: EMERGENCY MEDICINE
Payer: COMMERCIAL

## 2024-06-22 ENCOUNTER — NURSE TRIAGE (OUTPATIENT)
Dept: NURSING | Facility: CLINIC | Age: 54
End: 2024-06-22
Payer: COMMERCIAL

## 2024-06-22 VITALS
RESPIRATION RATE: 18 BRPM | OXYGEN SATURATION: 98 % | TEMPERATURE: 98.4 F | HEART RATE: 104 BPM | SYSTOLIC BLOOD PRESSURE: 137 MMHG | DIASTOLIC BLOOD PRESSURE: 98 MMHG

## 2024-06-22 DIAGNOSIS — K29.70 GASTRITIS: ICD-10-CM

## 2024-06-22 DIAGNOSIS — F41.9 ANXIETY: ICD-10-CM

## 2024-06-22 LAB
ALBUMIN SERPL BCG-MCNC: 4.8 G/DL (ref 3.5–5.2)
ALP SERPL-CCNC: 81 U/L (ref 40–150)
ALT SERPL W P-5'-P-CCNC: 11 U/L (ref 0–70)
ANION GAP SERPL CALCULATED.3IONS-SCNC: 10 MMOL/L (ref 7–15)
AST SERPL W P-5'-P-CCNC: 17 U/L (ref 0–45)
BASOPHILS # BLD AUTO: 0.1 10E3/UL (ref 0–0.2)
BASOPHILS NFR BLD AUTO: 1 %
BILIRUB SERPL-MCNC: 0.8 MG/DL
BUN SERPL-MCNC: 16.4 MG/DL (ref 6–20)
CALCIUM SERPL-MCNC: 9.8 MG/DL (ref 8.6–10)
CHLORIDE SERPL-SCNC: 100 MMOL/L (ref 98–107)
CREAT SERPL-MCNC: 1.18 MG/DL (ref 0.67–1.17)
DEPRECATED HCO3 PLAS-SCNC: 28 MMOL/L (ref 22–29)
EGFRCR SERPLBLD CKD-EPI 2021: 74 ML/MIN/1.73M2
EOSINOPHIL # BLD AUTO: 0.1 10E3/UL (ref 0–0.7)
EOSINOPHIL NFR BLD AUTO: 1 %
ERYTHROCYTE [DISTWIDTH] IN BLOOD BY AUTOMATED COUNT: 12.4 % (ref 10–15)
GLUCOSE SERPL-MCNC: 102 MG/DL (ref 70–99)
HCT VFR BLD AUTO: 48.4 % (ref 40–53)
HGB BLD-MCNC: 16.5 G/DL (ref 13.3–17.7)
IMM GRANULOCYTES # BLD: 0 10E3/UL
IMM GRANULOCYTES NFR BLD: 1 %
LIPASE SERPL-CCNC: 42 U/L (ref 13–60)
LYMPHOCYTES # BLD AUTO: 1.3 10E3/UL (ref 0.8–5.3)
LYMPHOCYTES NFR BLD AUTO: 18 %
MAGNESIUM SERPL-MCNC: 2.4 MG/DL (ref 1.7–2.3)
MCH RBC QN AUTO: 31.4 PG (ref 26.5–33)
MCHC RBC AUTO-ENTMCNC: 34.1 G/DL (ref 31.5–36.5)
MCV RBC AUTO: 92 FL (ref 78–100)
MONOCYTES # BLD AUTO: 0.7 10E3/UL (ref 0–1.3)
MONOCYTES NFR BLD AUTO: 10 %
NEUTROPHILS # BLD AUTO: 5 10E3/UL (ref 1.6–8.3)
NEUTROPHILS NFR BLD AUTO: 69 %
NRBC # BLD AUTO: 0 10E3/UL
NRBC BLD AUTO-RTO: 0 /100
PLATELET # BLD AUTO: 211 10E3/UL (ref 150–450)
POTASSIUM SERPL-SCNC: 4.6 MMOL/L (ref 3.4–5.3)
PROT SERPL-MCNC: 7.5 G/DL (ref 6.4–8.3)
RBC # BLD AUTO: 5.25 10E6/UL (ref 4.4–5.9)
SODIUM SERPL-SCNC: 138 MMOL/L (ref 135–145)
WBC # BLD AUTO: 7.2 10E3/UL (ref 4–11)

## 2024-06-22 PROCEDURE — 83690 ASSAY OF LIPASE: CPT

## 2024-06-22 PROCEDURE — 99284 EMERGENCY DEPT VISIT MOD MDM: CPT

## 2024-06-22 PROCEDURE — 250N000013 HC RX MED GY IP 250 OP 250 PS 637

## 2024-06-22 PROCEDURE — 36415 COLL VENOUS BLD VENIPUNCTURE: CPT

## 2024-06-22 PROCEDURE — 85041 AUTOMATED RBC COUNT: CPT

## 2024-06-22 PROCEDURE — 83735 ASSAY OF MAGNESIUM: CPT

## 2024-06-22 PROCEDURE — 80053 COMPREHEN METABOLIC PANEL: CPT

## 2024-06-22 RX ORDER — FAMOTIDINE 20 MG/1
20 TABLET, FILM COATED ORAL 2 TIMES DAILY
Qty: 30 TABLET | Refills: 0 | Status: SHIPPED | OUTPATIENT
Start: 2024-06-22

## 2024-06-22 RX ORDER — HYDROXYZINE HYDROCHLORIDE 25 MG/1
25 TABLET, FILM COATED ORAL ONCE
Status: COMPLETED | OUTPATIENT
Start: 2024-06-22 | End: 2024-06-22

## 2024-06-22 RX ORDER — MAGNESIUM HYDROXIDE/ALUMINUM HYDROXICE/SIMETHICONE 120; 1200; 1200 MG/30ML; MG/30ML; MG/30ML
15 SUSPENSION ORAL ONCE
Status: COMPLETED | OUTPATIENT
Start: 2024-06-22 | End: 2024-06-22

## 2024-06-22 RX ORDER — HYDROXYZINE HYDROCHLORIDE 25 MG/1
25 TABLET, FILM COATED ORAL 3 TIMES DAILY PRN
Qty: 12 TABLET | Refills: 0 | Status: SHIPPED | OUTPATIENT
Start: 2024-06-22

## 2024-06-22 RX ADMIN — HYDROXYZINE HYDROCHLORIDE 25 MG: 25 TABLET ORAL at 16:33

## 2024-06-22 ASSESSMENT — COLUMBIA-SUICIDE SEVERITY RATING SCALE - C-SSRS
6. HAVE YOU EVER DONE ANYTHING, STARTED TO DO ANYTHING, OR PREPARED TO DO ANYTHING TO END YOUR LIFE?: NO
1. IN THE PAST MONTH, HAVE YOU WISHED YOU WERE DEAD OR WISHED YOU COULD GO TO SLEEP AND NOT WAKE UP?: NO
2. HAVE YOU ACTUALLY HAD ANY THOUGHTS OF KILLING YOURSELF IN THE PAST MONTH?: NO

## 2024-06-22 ASSESSMENT — ACTIVITIES OF DAILY LIVING (ADL)
ADLS_ACUITY_SCORE: 35
ADLS_ACUITY_SCORE: 33

## 2024-06-22 NOTE — TELEPHONE ENCOUNTER
"The patient reports feeling overwhelmed  He says he feels like he has a fever , but his temperature is 98.9 by mouth  \"feels like something else is going on\"  He reports he is having panic attack  Symptoms started three weeks ago  He reports he does not have any anti-anxiety medication  He reports he is getting ready to move, and they be causing his symptoms, and that he has a historically PTSD  The patient denies any suicidal thoughts or homicidal ideations  Triage guidelines recommend to Go to ED Now  Caller verbalized and understands directives  Reason for Disposition   [1] Lightheadedness or dizziness AND [2] persists > 10 minutes AND [3] not relieved by reassurance provided by triager    Additional Information   Negative: SEVERE difficulty breathing (e.g., struggling for each breath, speaks in single words)   Negative: Bluish (or gray) lips or face now   Negative: Difficult to awaken or acting confused (e.g., disoriented, slurred speech)   Negative: Violent behavior, or threatening to physically hurt or kill someone   Negative: Sounds like a life-threatening emergency to the triager   Negative: Chest pain   Negative: Palpitations, skipped heartbeat, or rapid heartbeat is main symptom   Negative: Cough is main symptom   Negative: Suicide thoughts, threats, attempts, or questions   Negative: Depression is main problem or symptom (e.g., feelings of sadness or hopelessness)   Negative: [1] Difficulty breathing AND [2] persists > 10 minutes AND [3] not relieved by reassurance provided by triager    Protocols used: Anxiety and Panic Attack-A-AH    "

## 2024-06-22 NOTE — ED TRIAGE NOTES
Pt presents w/ ongoing anxiety, GI issues for months- burning and discomfort in his stomach- indicates upper abdomen. Has been seen for similar issues recently. Endorses intermittent diarrhea w/ more frequent constipation. Nausea but no vomiting. HIV +. States he may be experiencing mental health problems w/ anxiety related to his ongoing discomfort.

## 2024-06-22 NOTE — ED PROVIDER NOTES
ED Provider Note  [facility]      History     Chief Complaint   Patient presents with    Anxiety    GI Problem     The history is provided by the patient and medical records. No  was used.     Toi Gresham is a 53 year old male with a history of GERD, hypertension, HIV positive, lumbar radiculopathy who presents to the ED for GI upset and anxiety.  Patient was most recently here seen here at [facility] on 06/15/24 for abdominal pain. He states that his anxiety grew worse today and over the past couple of days prompting him to seek evaluation in the ED for possible panic attack today.  He states that he has been struggling with undiagnosed abdominal discomfort and burning for the past 9 months.  He states that he has been diagnosed with gastritis and cholelithiasis.  He states that he continues to have epigastric and left upper quadrant abdominal pain and burning that seems to be exacerbated with stress and eating and drinking certain foods.  He denies any radiation of the pain up into his chest but has some radiation into his left lower quadrant at times.  He reports nausea but denies any episodes of emesis or hematemesis.  He denies fever stating his max temperature has been 99.3  F which last occurred this morning.  He denies any melena, hematochezia, or black, tarry stools.  He states he waxes and wanes between constipation and diarrheal stools and that has been his normal for some time.  He denies any urinary symptoms of dysuria, hematuria, urgency or frequency of urination or any penile discharge.  He denies any flank pain.    He is most worried with anxiety as well today.  He states that he often is unable to stop ruminating and thinking about what is causing his symptoms.  He is concerned that he has lymphoma due to some enlarged nodes that were seen on a CT scan in his groin area about 9 months ago on 9/11/2023 with no comment on this or signs of  improvement on a repeat CT scan on 9/16/2023.  He has had significant workup in follow-up outpatient since his abdominal pain symptoms began 9 months ago including an endoscopy on 6/17/2024 and most recent ultrasound of his right upper quadrant on 6/15/2024.  He is scheduled to have an elective cholecystectomy on 7/1/2024 for suspected symptomatic cholelithiasis.  He is not currently on a PPI or other acid reducing medication.  He is not currently on any antianxiety or antidepressant daily.  He is scheduled to see a therapist at an outpatient clinic on Tuesday, 6/25/2024.  He denies any SI, HI, auditory or visual hallucinations.  He denies a history of suicidal ideation or attempt.    Recent endoscopy on 06/17/24 at [facility]:   Impressions/Post-Op Diagnosis:       - Z-line regular, 40 cm from the incisors.       - Normal esophagus.       - Erythematous mucosa in the antrum. Biopsied.       - Normal ampulla and examined duodenum. Biopsied.                                                                                   Recommendation:       - Await pathology results.       - Refer to a surgeon for cholecystectomy (Symptomatic cholelithiasis) at        appointment to be scheduled.    Pathology results from endoscopy on 06/17/24:  Final Diagnosis A) DUODENUM, BIOPSY:  1. Normal duodenal mucosa  2. Negative for celiac disease and other enteropathy    B) STOMACH, BIOPSY:  1. Normal gastric antral and body mucosae  2. Negative for Helicobacter     Per chart review at [facility] imaging:  _______________________________________________________________  Study Result  Narrative & Impression   EXAMINATION: Limited Abdominal Ultrasound, 6/15/2024 4:21 PM      COMPARISON: None.     History: RUQ pain correlates with eating, eval liver / gallbladder.      FINDINGS:  Pancreas: Partially visualized. Visualized portions of the head and  body of the pancreas are unremarkable.      Liver: The liver  demonstrates increased echogenicity of the hepatic  parenchyma focal fatty sparing seen adjacent to the gallbladder fossa.  No evidence of a focal hepatic mass. The main portal vein is patent  with antegrade flow.     Gallbladder: Cholelithiasis with stone seen in the gallbladder neck.  No gallbladder wall thickening or pericholecystic fluid. Sonographic  Stiles sign is negative.     Bile Ducts: Both the intra- and extrahepatic biliary system are of  normal caliber.  The common bile duct measures 4 mm in diameter.     Right Kidney: Measures 10.0 cm in length with normal parenchymal  echogenicity and cortical thickness. No hydronephrosis. Large  well-defined simple-appearing cyst along the mid pole measuring up to  4.6 cm in diameter.     Fluid: None in the visualized abdomen.                                                            IMPRESSION:   1. Cholelithiasis with stone seen in the gallbladder neck. No  sonographic evidence to suggest acute cholecystitis.  2. Increased echogenicity of the hepatic parenchyma, nonspecific, but  favored to represent sequelae of fatty liver deposition.       Past Medical History  Past Medical History:   Diagnosis Date    Gastroesophageal reflux disease without esophagitis     HTN (hypertension)     Human immunodeficiency virus (HIV) disease (H)      No past surgical history on file.  famotidine (PEPCID) 20 MG tablet  hydrOXYzine HCl (ATARAX) 25 MG tablet  bictegravir-emtricitabine-tenofovir (BIKTARVY) -25 MG per tablet  omeprazole (PRILOSEC) 20 MG DR capsule  polyethylene glycol (MIRALAX) 17 GM/Dose powder  sennosides (SENOKOT) 8.6 MG tablet      No Known Allergies  Family History  No family history on file.  Social History   Social History     Tobacco Use    Smoking status: Former     Current packs/day: 10.00     Types: Cigarettes    Smokeless tobacco: Never   Substance Use Topics    Alcohol use: Yes     Comment: 2 to 3 drinks weekly    Drug use: Never      Past medical  history, past surgical history, medications, allergies, family history, and social history were reviewed with the patient. No additional pertinent items.     A medically appropriate review of systems was performed with pertinent positives and negatives noted in the HPI, and all other systems negative.    Physical Exam   BP: (!) 137/98  Pulse: 104  Temp: 98.4  F (36.9  C)  Resp: 18  SpO2: 98 %    Physical Exam  Constitutional:       General: He is not in acute distress.     Appearance: Normal appearance. He is normal weight. He is not ill-appearing, toxic-appearing or diaphoretic.   HENT:      Mouth/Throat:      Mouth: Mucous membranes are moist.      Pharynx: Oropharynx is clear.   Cardiovascular:      Rate and Rhythm: Regular rhythm. Tachycardia present.   Pulmonary:      Effort: Pulmonary effort is normal.      Breath sounds: Normal breath sounds.   Abdominal:      General: Abdomen is flat. Bowel sounds are normal. There is no distension.      Palpations: Abdomen is soft.      Tenderness: There is no abdominal tenderness. There is no right CVA tenderness, left CVA tenderness, guarding or rebound.   Skin:     General: Skin is warm.      Capillary Refill: Capillary refill takes less than 2 seconds.      Findings: No erythema or rash.   Neurological:      General: No focal deficit present.      Mental Status: He is alert. Mental status is at baseline.   Psychiatric:         Attention and Perception: Attention and perception normal. He does not perceive auditory or visual hallucinations.         Mood and Affect: Affect normal. Mood is anxious.         Speech: Speech normal.         Behavior: Behavior normal. Behavior is cooperative.         Thought Content: Thought content normal. Thought content is not paranoid or delusional. Thought content does not include homicidal or suicidal ideation. Thought content does not include homicidal or suicidal plan.         Cognition and Memory: Cognition and memory normal.          ED Course, Procedures, & Data     ED Course as of 06/22/24 1833   Sat Jun 22, 2024   1721 Magnesium(!): 2.4     Procedures              Results for orders placed or performed during the hospital encounter of 06/22/24   Lipase     Status: Normal   Result Value Ref Range    Lipase 42 13 - 60 U/L   Comprehensive metabolic panel     Status: Abnormal   Result Value Ref Range    Sodium 138 135 - 145 mmol/L    Potassium 4.6 3.4 - 5.3 mmol/L    Carbon Dioxide (CO2) 28 22 - 29 mmol/L    Anion Gap 10 7 - 15 mmol/L    Urea Nitrogen 16.4 6.0 - 20.0 mg/dL    Creatinine 1.18 (H) 0.67 - 1.17 mg/dL    GFR Estimate 74 >60 mL/min/1.73m2    Calcium 9.8 8.6 - 10.0 mg/dL    Chloride 100 98 - 107 mmol/L    Glucose 102 (H) 70 - 99 mg/dL    Alkaline Phosphatase 81 40 - 150 U/L    AST 17 0 - 45 U/L    ALT 11 0 - 70 U/L    Protein Total 7.5 6.4 - 8.3 g/dL    Albumin 4.8 3.5 - 5.2 g/dL    Bilirubin Total 0.8 <=1.2 mg/dL   Magnesium     Status: Abnormal   Result Value Ref Range    Magnesium 2.4 (H) 1.7 - 2.3 mg/dL   CBC with platelets and differential     Status: None   Result Value Ref Range    WBC Count 7.2 4.0 - 11.0 10e3/uL    RBC Count 5.25 4.40 - 5.90 10e6/uL    Hemoglobin 16.5 13.3 - 17.7 g/dL    Hematocrit 48.4 40.0 - 53.0 %    MCV 92 78 - 100 fL    MCH 31.4 26.5 - 33.0 pg    MCHC 34.1 31.5 - 36.5 g/dL    RDW 12.4 10.0 - 15.0 %    Platelet Count 211 150 - 450 10e3/uL    % Neutrophils 69 %    % Lymphocytes 18 %    % Monocytes 10 %    % Eosinophils 1 %    % Basophils 1 %    % Immature Granulocytes 1 %    NRBCs per 100 WBC 0 <1 /100    Absolute Neutrophils 5.0 1.6 - 8.3 10e3/uL    Absolute Lymphocytes 1.3 0.8 - 5.3 10e3/uL    Absolute Monocytes 0.7 0.0 - 1.3 10e3/uL    Absolute Eosinophils 0.1 0.0 - 0.7 10e3/uL    Absolute Basophils 0.1 0.0 - 0.2 10e3/uL    Absolute Immature Granulocytes 0.0 <=0.4 10e3/uL    Absolute NRBCs 0.0 10e3/uL   CBC with platelets differential     Status: None    Narrative    The following orders were  created for panel order CBC with platelets differential.  Procedure                               Abnormality         Status                     ---------                               -----------         ------                     CBC with platelets and d...[[id number]]                      Final result                 Please view results for these tests on the individual orders.     Medications   hydrOXYzine HCl (ATARAX) tablet 25 mg (25 mg Oral $Given 6/22/24 5069)   alum & mag hydroxide-simethicone (MAALOX) suspension 15 mL (15 mLs Oral Not Given 6/22/24 5062)     Labs Ordered and Resulted from Time of ED Arrival to Time of ED Departure   COMPREHENSIVE METABOLIC PANEL - Abnormal       Result Value    Sodium 138      Potassium 4.6      Carbon Dioxide (CO2) 28      Anion Gap 10      Urea Nitrogen 16.4      Creatinine 1.18 (*)     GFR Estimate 74      Calcium 9.8      Chloride 100      Glucose 102 (*)     Alkaline Phosphatase 81      AST 17      ALT 11      Protein Total 7.5      Albumin 4.8      Bilirubin Total 0.8     MAGNESIUM - Abnormal    Magnesium 2.4 (*)    LIPASE - Normal    Lipase 42     CBC WITH PLATELETS AND DIFFERENTIAL    WBC Count 7.2      RBC Count 5.25      Hemoglobin 16.5      Hematocrit 48.4      MCV 92      MCH 31.4      MCHC 34.1      RDW 12.4      Platelet Count 211      % Neutrophils 69      % Lymphocytes 18      % Monocytes 10      % Eosinophils 1      % Basophils 1      % Immature Granulocytes 1      NRBCs per 100 WBC 0      Absolute Neutrophils 5.0      Absolute Lymphocytes 1.3      Absolute Monocytes 0.7      Absolute Eosinophils 0.1      Absolute Basophils 0.1      Absolute Immature Granulocytes 0.0      Absolute NRBCs 0.0       No orders to display          Critical care was not performed.     Medical Decision Making  The patient's presentation was of moderate complexity (an acute illness with systemic symptoms).    The patient's evaluation involved:  review of external note(s) from 1  sources (ED encounters 6/15/2024; gastroenterology encounters through [facility])  review of 3+ test result(s) ordered prior to this encounter (ultrasound results 6/15/2024, CT abdomen and pelvis 9/11/2023 and 9/16/2023; pathology results from endoscopy 6/17/2024)  ordering and/or review of 3+ test(s) in this encounter (see separate area of note for details)    The patient's management necessitated moderate risk (prescription drug management including medications given in the ED).    Assessment & Plan    Toi is a 53-year-old male that presented to the ED with complaints of continued, baseline epigastric and abdominal discomfort and burning as well as acute anxiety.  Tachycardic heart rate on presentation with otherwise reassuring vital signs without fever, hypotension, hypoxia on room air.  Patient in no acute distress, nontoxic-appearing, and hemodynamically stable throughout the ED visit.  No acute abdomen signs on palpation including guarding, rebound, rigidity.  Labs obtained to compare to previous in the setting of continued, baseline abdominal discomfort and burning.  Minimal hypermagnesemia 2.4.  Stable, baseline creatinine at 1.18.  Labs otherwise unremarkable and within normal limits.  P.o. hydroxyzine given in the ED for symptoms of anxiety.  Long discussion had with the patient and his friend about anxiety and how this could be contributing to his suspected gastritis pain versus symptomatic cholelithiasis.  Discussed possible DEC assessment but discussed that this may not have much aid in the workup today and that they most likely will recommend outpatient follow-up for anxiety for which she has an appointment already on Tuesday, 6/25/2024.  Patient was comfortable with trying medication and repeating labs here in the ED.  Patient had some improvement of his symptoms with p.o. hydroxyzine.  At this time, patient is stable for discharge home with continued follow-up with gastroenterology as well as  establishing care with therapy/psychiatrist for further management of anxiety with possibly starting medications.  Strict return precautions discussed at length.  Rx Pepcid and hydroxyzine.  Referrals were sent for behavioral health and gastroenterology here at [facility] if he decides he would like to follow-up with our services at any point.  Patient was ultimately agreeable to the discharge treatment plan, voiced understanding, and all questions were answered prior to discharge.    I have reviewed the nursing notes. I have reviewed the findings, diagnosis, plan and need for follow up with the patient.    Discharge Medication List as of 6/22/2024  5:57 PM        START taking these medications    Details   famotidine (PEPCID) 20 MG tablet Take 1 tablet (20 mg) by mouth 2 times daily, Disp-30 tablet, R-0, E-Prescribe      hydrOXYzine HCl (ATARAX) 25 MG tablet Take 1 tablet (25 mg) by mouth 3 times daily as needed for anxiety, Disp-12 tablet, R-0, E-Prescribe             Final diagnoses:   Anxiety   Gastritis       Tonia Huang PA-C    [facility] EMERGENCY DEPARTMENT  6/22/2024     Tonia Huang PA-C  06/22/24 1329

## 2024-06-22 NOTE — DISCHARGE INSTRUCTIONS
Utilize hydroxyzine only as needed up to 3 times a day for symptoms of acute, severe anxiety  Begin Pepcid as prescribed to help with reducing acid production and epigastric abdominal pain  A referral was sent for gastroenterology here at [facility] for follow-up as you deem appropriate for reevaluation recheck of your symptoms  A referral was sent for behavioral health here at [facility] for follow-up and establishment of care for your anxiety and discussion of medication management  Continue to follow with your current gastroenterologist for further discussion of symptom management and further discussion of your elective removal of your gallbladder the is scheduled for next month  Otherwise, do not hesitate to return to the ED if you have any worsening or concerning signs or symptoms

## 2024-06-24 ENCOUNTER — PATIENT OUTREACH (OUTPATIENT)
Dept: CARE COORDINATION | Facility: CLINIC | Age: 54
End: 2024-06-24
Payer: COMMERCIAL

## 2024-06-24 ENCOUNTER — TELEPHONE (OUTPATIENT)
Dept: INFECTIOUS DISEASES | Facility: CLINIC | Age: 54
End: 2024-06-24
Payer: COMMERCIAL

## 2024-06-24 NOTE — PROGRESS NOTES
Clinic Care Coordination Contact  Follow Up Progress Note      Assessment: The pt was recently in the ED, I called to check up on the pt and help the pt setup a ED follow up. The pt was at [facility] for anxiety. I called and talked to the pt,pt stated that he is doing better. He did not feel that he needs a follow up.    Care Gaps:    Health Maintenance Due   Topic Date Due    YEARLY PREVENTIVE VISIT  Never done    HEPATITIS A IMMUNIZATION (1 of 2 - Risk 2-dose series) Never done    HEPATITIS B IMMUNIZATION (1 of 3 - 19+ 3-dose series) Never done    LUNG CANCER SCREENING  Never done           Care Plans      Intervention/Education provided during outreach:               Plan:     Care Coordinator will follow up in

## 2024-06-24 NOTE — TELEPHONE ENCOUNTER
EP called 6/24 to sched a New B20 visit with any B20 ID provider per pt's request. He used to see Dr. Jean but wanted to switch providers, anyone who had more avail.

## 2024-06-25 NOTE — CONFIDENTIAL NOTE
DIAGNOSIS:  B20 F/U   DATE RECEIVED:  06.27.72024    NOTES (Gather within 2 years) STATUS DETAILS   OFFICE NOTE from referring provider       OFFICE NOTE from other specialist Internal 09.21.2023 Alison Jean MD    LABS (any labs) Internal    MEDICATION LIST Internal

## 2024-06-26 RX ORDER — ALPRAZOLAM 0.5 MG
TABLET ORAL
OUTPATIENT
Start: 2024-06-26

## 2024-06-27 ENCOUNTER — PRE VISIT (OUTPATIENT)
Dept: INFECTIOUS DISEASES | Facility: CLINIC | Age: 54
End: 2024-06-27
Payer: COMMERCIAL

## 2024-07-01 ENCOUNTER — APPOINTMENT (OUTPATIENT)
Dept: ULTRASOUND IMAGING | Facility: CLINIC | Age: 54
End: 2024-07-01
Attending: EMERGENCY MEDICINE
Payer: COMMERCIAL

## 2024-07-01 ENCOUNTER — HOSPITAL ENCOUNTER (EMERGENCY)
Facility: CLINIC | Age: 54
Discharge: HOME | End: 2024-07-01
Attending: EMERGENCY MEDICINE | Admitting: EMERGENCY MEDICINE
Payer: COMMERCIAL

## 2024-07-01 ENCOUNTER — PRE VISIT (OUTPATIENT)
Dept: SURGERY | Facility: CLINIC | Age: 54
End: 2024-07-01

## 2024-07-01 VITALS
OXYGEN SATURATION: 96 % | RESPIRATION RATE: 18 BRPM | TEMPERATURE: 98.1 F | SYSTOLIC BLOOD PRESSURE: 109 MMHG | DIASTOLIC BLOOD PRESSURE: 74 MMHG | HEART RATE: 63 BPM

## 2024-07-01 DIAGNOSIS — N50.811 PAIN IN BOTH TESTICLES: ICD-10-CM

## 2024-07-01 DIAGNOSIS — N50.812 PAIN IN BOTH TESTICLES: ICD-10-CM

## 2024-07-01 LAB
ALBUMIN SERPL BCG-MCNC: 4.5 G/DL (ref 3.5–5.2)
ALBUMIN UR-MCNC: NEGATIVE MG/DL
ALP SERPL-CCNC: 78 U/L (ref 40–150)
ALT SERPL W P-5'-P-CCNC: 13 U/L (ref 0–70)
ANION GAP SERPL CALCULATED.3IONS-SCNC: 10 MMOL/L (ref 7–15)
APPEARANCE UR: CLEAR
AST SERPL W P-5'-P-CCNC: 22 U/L (ref 0–45)
BASOPHILS # BLD AUTO: 0.1 10E3/UL (ref 0–0.2)
BASOPHILS NFR BLD AUTO: 1 %
BILIRUB SERPL-MCNC: 1.2 MG/DL
BILIRUB UR QL STRIP: NEGATIVE
BUN SERPL-MCNC: 16.7 MG/DL (ref 6–20)
CALCIUM SERPL-MCNC: 9.6 MG/DL (ref 8.6–10)
CHLORIDE SERPL-SCNC: 103 MMOL/L (ref 98–107)
COLOR UR AUTO: ABNORMAL
CREAT SERPL-MCNC: 1.19 MG/DL (ref 0.67–1.17)
DEPRECATED HCO3 PLAS-SCNC: 27 MMOL/L (ref 22–29)
EGFRCR SERPLBLD CKD-EPI 2021: 73 ML/MIN/1.73M2
EOSINOPHIL # BLD AUTO: 0.2 10E3/UL (ref 0–0.7)
EOSINOPHIL NFR BLD AUTO: 2 %
ERYTHROCYTE [DISTWIDTH] IN BLOOD BY AUTOMATED COUNT: 12.2 % (ref 10–15)
GLUCOSE SERPL-MCNC: 121 MG/DL (ref 70–99)
GLUCOSE UR STRIP-MCNC: NEGATIVE MG/DL
HCT VFR BLD AUTO: 48.8 % (ref 40–53)
HGB BLD-MCNC: 16.3 G/DL (ref 13.3–17.7)
HGB UR QL STRIP: ABNORMAL
IMM GRANULOCYTES # BLD: 0 10E3/UL
IMM GRANULOCYTES NFR BLD: 0 %
KETONES UR STRIP-MCNC: NEGATIVE MG/DL
LEUKOCYTE ESTERASE UR QL STRIP: NEGATIVE
LYMPHOCYTES # BLD AUTO: 1.7 10E3/UL (ref 0.8–5.3)
LYMPHOCYTES NFR BLD AUTO: 23 %
MCH RBC QN AUTO: 31.3 PG (ref 26.5–33)
MCHC RBC AUTO-ENTMCNC: 33.4 G/DL (ref 31.5–36.5)
MCV RBC AUTO: 94 FL (ref 78–100)
MONOCYTES # BLD AUTO: 0.8 10E3/UL (ref 0–1.3)
MONOCYTES NFR BLD AUTO: 10 %
NEUTROPHILS # BLD AUTO: 4.9 10E3/UL (ref 1.6–8.3)
NEUTROPHILS NFR BLD AUTO: 64 %
NITRATE UR QL: NEGATIVE
NRBC # BLD AUTO: 0 10E3/UL
NRBC BLD AUTO-RTO: 0 /100
PH UR STRIP: 6.5 [PH] (ref 5–7)
PLATELET # BLD AUTO: 168 10E3/UL (ref 150–450)
POTASSIUM SERPL-SCNC: 4 MMOL/L (ref 3.4–5.3)
PROT SERPL-MCNC: 7.2 G/DL (ref 6.4–8.3)
RBC # BLD AUTO: 5.21 10E6/UL (ref 4.4–5.9)
RBC URINE: 11 /HPF
SODIUM SERPL-SCNC: 140 MMOL/L (ref 135–145)
SP GR UR STRIP: 1.02 (ref 1–1.03)
UROBILINOGEN UR STRIP-MCNC: NORMAL MG/DL
WBC # BLD AUTO: 7.6 10E3/UL (ref 4–11)
WBC URINE: 2 /HPF

## 2024-07-01 PROCEDURE — 99284 EMERGENCY DEPT VISIT MOD MDM: CPT | Performed by: EMERGENCY MEDICINE

## 2024-07-01 PROCEDURE — 81001 URINALYSIS AUTO W/SCOPE: CPT | Performed by: EMERGENCY MEDICINE

## 2024-07-01 PROCEDURE — 93976 VASCULAR STUDY: CPT

## 2024-07-01 PROCEDURE — 85025 COMPLETE CBC W/AUTO DIFF WBC: CPT | Performed by: EMERGENCY MEDICINE

## 2024-07-01 PROCEDURE — 99207 US TESTICULAR AND SCROTUM WITH DOPPLER LIMITED: CPT | Mod: 26 | Performed by: RADIOLOGY

## 2024-07-01 PROCEDURE — 80053 COMPREHEN METABOLIC PANEL: CPT | Performed by: EMERGENCY MEDICINE

## 2024-07-01 PROCEDURE — 99284 EMERGENCY DEPT VISIT MOD MDM: CPT | Mod: 25 | Performed by: EMERGENCY MEDICINE

## 2024-07-01 PROCEDURE — 36415 COLL VENOUS BLD VENIPUNCTURE: CPT | Performed by: EMERGENCY MEDICINE

## 2024-07-01 PROCEDURE — 76870 US EXAM SCROTUM: CPT

## 2024-07-01 PROCEDURE — 76870 US EXAM SCROTUM: CPT | Mod: 26 | Performed by: RADIOLOGY

## 2024-07-01 ASSESSMENT — ACTIVITIES OF DAILY LIVING (ADL)
ADLS_ACUITY_SCORE: 33

## 2024-07-01 ASSESSMENT — COLUMBIA-SUICIDE SEVERITY RATING SCALE - C-SSRS
6. HAVE YOU EVER DONE ANYTHING, STARTED TO DO ANYTHING, OR PREPARED TO DO ANYTHING TO END YOUR LIFE?: NO
2. HAVE YOU ACTUALLY HAD ANY THOUGHTS OF KILLING YOURSELF IN THE PAST MONTH?: NO
1. IN THE PAST MONTH, HAVE YOU WISHED YOU WERE DEAD OR WISHED YOU COULD GO TO SLEEP AND NOT WAKE UP?: NO

## 2024-07-02 ENCOUNTER — PATIENT OUTREACH (OUTPATIENT)
Dept: CARE COORDINATION | Facility: CLINIC | Age: 54
End: 2024-07-02
Payer: COMMERCIAL

## 2024-07-02 NOTE — PROGRESS NOTES
Clinic Care Coordination Contact  Follow Up Progress Note      Assessment:  The pt was recently in the ED, I called to check up on the pt and help setup a ED follow up. The pt was at [facility] for groin pain.  I called the pt,but got his vm so I left a vm for the pt to give me a call back.     Care Gaps:    Health Maintenance Due   Topic Date Due    YEARLY PREVENTIVE VISIT  Never done    HEPATITIS A IMMUNIZATION (1 of 2 - Risk 2-dose series) Never done    HEPATITIS B IMMUNIZATION (1 of 3 - 19+ 3-dose series) Never done    LUNG CANCER SCREENING  Never done           Care Plans      Intervention/Education provided during outreach:               Plan:     Care Coordinator will follow up in

## 2024-07-02 NOTE — DISCHARGE INSTRUCTIONS
Please make an appointment to follow up with Your Primary Care Provider as soon as possible.    Return to the emergency department if you develop worsening symptoms or if you have any further concerns.

## 2024-07-02 NOTE — ED PROVIDER NOTES
"ED Provider Note  [facility] EMERGENCY DEPARTMENT ([facility])    7/01/24       ED PROVIDER NOTE       History     Chief Complaint   Patient presents with    Groin Pain     The history is provided by the patient and medical records.     Toi Gresham is a 53 year old male with a history of HTN, GERD, gastritis, infectious gastroenteritis and colitis, diverticulitis of colon, gallstones, HIV disease, and lumbar radiculopathy who presents to the ED for groin pain. Patient reports worsening testicle and groin pain for the past four days. He states pain began on left side of testicle then began to radiate to right testicle eventually radiating towards groin. He reports the pain to be of \"stinging\" and \"burning\" sensation. Patient has had abnormal bowel movements due to constipation. He had first bowel movement today within the past four days. He also been experiencing indigestion, gas, and slight nausea. He notes recent intermittent GI issues. Patient denies urinary issues.    Past Medical History  Past Medical History:   Diagnosis Date    Gastroesophageal reflux disease without esophagitis     HTN (hypertension)     Human immunodeficiency virus (HIV) disease (H)      No past surgical history on file.  bictegravir-emtricitabine-tenofovir (BIKTARVY) -25 MG per tablet  famotidine (PEPCID) 20 MG tablet  hydrOXYzine HCl (ATARAX) 25 MG tablet  omeprazole (PRILOSEC) 20 MG DR capsule  polyethylene glycol (MIRALAX) 17 GM/Dose powder  sennosides (SENOKOT) 8.6 MG tablet      No Known Allergies  Family History  No family history on file.  Social History   Social History     Tobacco Use    Smoking status: Former     Current packs/day: 10.00     Types: Cigarettes    Smokeless tobacco: Never   Substance Use Topics    Alcohol use: Yes     Comment: 2 to 3 drinks weekly    Drug use: Never      Past medical history, past surgical history, medications, allergies, family history, and " social history were reviewed with the patient. No additional pertinent items.   ROS: 14 point ROS neg other than the symptoms noted above in the HPI.     Physical Exam   BP: 132/88  Pulse: 79  Temp: 97.6  F (36.4  C)  Resp: 16  SpO2: 98 %  Physical Exam  Physical Exam   Constitutional: oriented to person, place, and time. appears well-developed and well-nourished.   HENT:   Head: Normocephalic and atraumatic.   Neck: Normal range of motion.   Pulmonary/Chest: Effort normal. No respiratory distress.   Cardiac: No murmurs, rubs, gallops. RRR.  Abdominal: Abdomen soft, nontender, nondistended. No rebound tenderness.  MSK: Long bones without deformity or evidence of trauma  Neurological: alert and oriented to person, place, and time.   Skin: Skin is warm and dry.   Psychiatric:  normal mood and affect.  behavior is normal. Thought content normal.    : Total testicles with normal lie.  Cremasteric reflex intact bilaterally.  No tenderness over the testicles.  Normal color.  No swelling of the testicles.    ED Course, Procedures, & Data      Procedures            Results for orders placed or performed during the hospital encounter of 07/01/24   US Testicular & Scrotum w Doppler Ltd     Status: None (Preliminary result)    Impression    RESIDENT PRELIMINARY INTERPRETATION  Impression:   1.  No evidence for acute testicular torsion.  2.  Right paratesticular 0.7cm complex epididymal cyst. No specific  follow-up is needed for this finding.   POC US ECHO LIMITED     Status: None    Impression    This was ordered in error, ultrasound not done   Comprehensive metabolic panel     Status: Abnormal   Result Value Ref Range    Sodium 140 135 - 145 mmol/L    Potassium 4.0 3.4 - 5.3 mmol/L    Carbon Dioxide (CO2) 27 22 - 29 mmol/L    Anion Gap 10 7 - 15 mmol/L    Urea Nitrogen 16.7 6.0 - 20.0 mg/dL    Creatinine 1.19 (H) 0.67 - 1.17 mg/dL    GFR Estimate 73 >60 mL/min/1.73m2    Calcium 9.6 8.6 - 10.0 mg/dL    Chloride 103 98 - 107  mmol/L    Glucose 121 (H) 70 - 99 mg/dL    Alkaline Phosphatase 78 40 - 150 U/L    AST 22 0 - 45 U/L    ALT 13 0 - 70 U/L    Protein Total 7.2 6.4 - 8.3 g/dL    Albumin 4.5 3.5 - 5.2 g/dL    Bilirubin Total 1.2 <=1.2 mg/dL   UA with Microscopic reflex to Culture     Status: Abnormal    Specimen: Urine, Midstream   Result Value Ref Range    Color Urine Light Yellow Colorless, Straw, Light Yellow, Yellow    Appearance Urine Clear Clear    Glucose Urine Negative Negative mg/dL    Bilirubin Urine Negative Negative    Ketones Urine Negative Negative mg/dL    Specific Gravity Urine 1.022 1.003 - 1.035    Blood Urine Small (A) Negative    pH Urine 6.5 5.0 - 7.0    Protein Albumin Urine Negative Negative mg/dL    Urobilinogen Urine Normal Normal, 2.0 mg/dL    Nitrite Urine Negative Negative    Leukocyte Esterase Urine Negative Negative    RBC Urine 11 (H) <=2 /HPF    WBC Urine 2 <=5 /HPF    Narrative    Urine Culture not indicated   CBC with platelets and differential     Status: None   Result Value Ref Range    WBC Count 7.6 4.0 - 11.0 10e3/uL    RBC Count 5.21 4.40 - 5.90 10e6/uL    Hemoglobin 16.3 13.3 - 17.7 g/dL    Hematocrit 48.8 40.0 - 53.0 %    MCV 94 78 - 100 fL    MCH 31.3 26.5 - 33.0 pg    MCHC 33.4 31.5 - 36.5 g/dL    RDW 12.2 10.0 - 15.0 %    Platelet Count 168 150 - 450 10e3/uL    % Neutrophils 64 %    % Lymphocytes 23 %    % Monocytes 10 %    % Eosinophils 2 %    % Basophils 1 %    % Immature Granulocytes 0 %    NRBCs per 100 WBC 0 <1 /100    Absolute Neutrophils 4.9 1.6 - 8.3 10e3/uL    Absolute Lymphocytes 1.7 0.8 - 5.3 10e3/uL    Absolute Monocytes 0.8 0.0 - 1.3 10e3/uL    Absolute Eosinophils 0.2 0.0 - 0.7 10e3/uL    Absolute Basophils 0.1 0.0 - 0.2 10e3/uL    Absolute Immature Granulocytes 0.0 <=0.4 10e3/uL    Absolute NRBCs 0.0 10e3/uL   CBC with platelets differential     Status: None    Narrative    The following orders were created for panel order CBC with platelets differential.  Procedure                                Abnormality         Status                     ---------                               -----------         ------                     CBC with platelets and d...[[id number]]                      Final result                 Please view results for these tests on the individual orders.     Medications - No data to display  Labs Ordered and Resulted from Time of ED Arrival to Time of ED Departure - No data to display  No orders to display          Critical care was not performed.     Medical Decision Making  The patient's presentation was of low complexity (an acute and uncomplicated illness or injury).    The patient's evaluation involved:  ordering and/or review of 3+ test(s) in this encounter (see separate area of note for details)    The patient's management necessitated only low risk treatment.    Assessment & Plan    MDM  Patient presenting with testicular pain.  Exam is normal without signs of Pilar's gangrene, torsion, orchitis or other infection.  Urinalysis without concerning signs of infection.  Ultrasound shows a small cyst that is benign, no repeat needed according to radiology.  Patient is reassured.  They are moving back to [place] and he will get a primary care provider at that point.  He has no symptoms of prostatitis or dysuria.    I have reviewed the nursing notes. I have reviewed the findings, diagnosis, plan and need for follow up with the patient.    New Prescriptions    No medications on file       Final diagnoses:   Pain in both testicles   I, Kaylin Conrad, am serving as a trained medical scribe to document services personally performed by Prince Archer MD, based on the provider's statements to me.     Prince FIGUEROA MD, was physically present and have reviewed and verified the accuracy of this note documented by Kaylin Conrad.     Prince Archer MD  [facility] EMERGENCY DEPARTMENT  7/1/2024     Prince Archer MD  07/01/24 [id number]

## 2024-07-03 ENCOUNTER — PATIENT OUTREACH (OUTPATIENT)
Dept: CARE COORDINATION | Facility: CLINIC | Age: 54
End: 2024-07-03
Payer: COMMERCIAL

## 2024-07-03 NOTE — PROGRESS NOTES
Clinic Care Coordination Contact  Follow Up Progress Note      Assessment:   The pt was recently in the ED, I called to check up on the pt and help setup a ED follow up. The pt was at [facility] for groin pain.  I called the pt,but got his vm so I left a vm for the pt to give me a call back.        Care Gaps:    Health Maintenance Due   Topic Date Due    YEARLY PREVENTIVE VISIT  Never done    HEPATITIS A IMMUNIZATION (1 of 2 - Risk 2-dose series) Never done    HEPATITIS B IMMUNIZATION (1 of 3 - 19+ 3-dose series) Never done    LUNG CANCER SCREENING  Never done           Care Plans      Intervention/Education provided during outreach:               Plan:     Care Coordinator will follow up in

## 2025-03-16 ENCOUNTER — HEALTH MAINTENANCE LETTER (OUTPATIENT)
Age: 55
End: 2025-03-16